# Patient Record
Sex: FEMALE | Race: WHITE | NOT HISPANIC OR LATINO | Employment: OTHER | URBAN - METROPOLITAN AREA
[De-identification: names, ages, dates, MRNs, and addresses within clinical notes are randomized per-mention and may not be internally consistent; named-entity substitution may affect disease eponyms.]

---

## 2022-06-03 ENCOUNTER — TELEPHONE (OUTPATIENT)
Dept: CARDIOLOGY | Facility: MEDICAL CENTER | Age: 82
End: 2022-06-03

## 2022-06-03 NOTE — TELEPHONE ENCOUNTER
Called patient to request records for NP appointment with LS. Called to verify previous cardiologists the patient has seen and to confirm if the patient has had any recent cardiac imaging, testing, or lab work done outside of [facility]. No answer, left voicemail to call back.

## 2022-06-06 NOTE — PROGRESS NOTES
Subjective:   Chief Complaint:   Chief Complaint   Patient presents with   • Atrial Fibrillation       Pham Stafford is a 81 y.o. female who is self-referred to establish with a local cardiologist for the management of atrial flutter, persistent atrial fibrillation, hyperlipidemia, IFG, chronic anticoagulation, tricuspid regurgitation, mild LV dysfunction.    I was able to review cardiology note from [facility] dated 3/11/2022, provider was Dr. Mine Burnham.  Patient had presented 2022 with new onset atrial flutter with 2:1 conduction, rate 138 bpm (I can see the report but not the actual ECG), underwent cardioversion 3/4/2022 for an ECG that was reported as atrial fibrillation.    TSH was normal.  High-sensitivity troponin was normal.  proBNP was up a bit  Remains on diltiazem and digoxin.  Mildly dilated left atrium and EF of 50% and 2022 at [facility], in [place].  Today, her ECG shows atrial fibrillation, rate 90.  She is noting fluttering in the chest, she suspected, suspects the CV lasted about 1 month.  Feels short of breath, some fatigue.    She drinks 1-2 alcoholic beverages daily, sometimes more if out gambling.  Drinks decaf coffee.    Remains on Eliquis for CHADS2 vascular score of 6, prior TIA.  Was walking, stopped at neighbors house, was confused, jumbled words.    Echo 2022 with moderate to severe TR, RVSP 41.    Has some mild chronic HFpEF, on furosemide, recalls starting this for LE edema.  Accidentally taking furosemide twice daily including before bedtime.  Has [facility] 2-3, stage B.  No significant orthopnea.    Has hypertension, controlled.    Has hyperlipidemia, on atorvastatin 40 mg.  LDL cholesterol 100 in 2021.    Has impaired fasting glucose but no diabetes.    Has chronic venous insufficiency with mild chronic LE edema, not getting worse.    Has CKD 3a.    Gets diarrhea at times.    Father  at 76 of COPD, ? myocardial infarction in 60s.   Mother   at 82 from a stroke.  Sisters with MS.  Daughter Elba has MS.    Not limited by chest pain, pressure or tightness with activity.   No significant lightheadedness, or presyncope/syncope.   No symptoms of leg claudication.   No stroke/TIA like symptoms.    She smoked 1 pack per day for 20 years quitting in .   No recreation substance use.    She has 3 adult children.    in 2016, leukemia, copd.   Living part-time in a [facility] community alone.  Then [place] in the winter, Nov to April in [place].    Here with her daughter Elba, she lives in [place].    DATA REVIEWED by me:  ECG (my personal interpretation) 2022  Atrial fibrillation, rate 90, nonspecific ST changes    24-hour Holter Btarget  *The predominant rhythm was with sustained variable atrial flutter.   *The Maximum Heart Rate recorded was 148 bpm, Day :30:04 am, the   Minimum Heart Rate recorded was   59 bpm, Day 2 / 10:26:14 am and the Average Heart Rate was 120 bpm.   *There were 265 PVCs with a burden of 0.15 %.   *The study included an Atrial Flutter [place] of 100 %. The longest episode   was 23h 56min, Day :09:24 pm   and the fastest episode was 148 bpm, Day :09:24 pm.      Echo Btarget 2022  Conclusions   Normal LV cavity size. Normal LV wall thickness. Borderline reduced LV systolic function. Estimated LVEF of 50%. Indeterminate LV diastolic function.   Normal right ventricular size and systolic function   Mild left atrial enlargement.   Normal right atrial size.   Interatrial septum appears intact. No interatrial shunt by bubble study.   Mild mitral annular calcification. Mildly thickened mitral valve leaflets with normal leaflet mobility. Mild mitral regurgitation.   Mild aortic valve thickening with normal leaflet mobility.   Normal appearing tricuspid valve. Moderate to severe tricuspid regurgitation. Estimated RVSP of 41 mm Hg suggests mild pulmonary hypertension.   Mild pulmonic insufficiency.    No pericardial effusion.   Inferior vena cava is normal in size with a normal collapsibility index, suggesting normal RA pressure.   Persistent atrial flutter.       Most recent labs:       No results found for: WBC, HEMOGLOBIN, HEMATOCRIT, MCV, INR, TSH   No results found for: SODIUM, POTASSIUM, CHLORIDE, CO2, GLUCOSE, BUN, CREATININE, GLOMRATE   No results found for: ASTSGOT, ALTSGPT, ALBUMIN   No results found for: CHOLSTRLTOT, LDL, HDL, TRIGLYCERIDE  No results for input(s): NTPROBNP, TROPONINT in the last 72 hours.      History reviewed. No pertinent past medical history.  History reviewed. No pertinent surgical history.  Family History   Problem Relation Age of Onset   • Stroke Mother          of CVA at 82   • Heart Disease Father         MI? 60s   • Lung Disease Father          of COPD at 76     Social History     Socioeconomic History   • Marital status:      Spouse name: Not on file   • Number of children: Not on file   • Years of education: Not on file   • Highest education level: Not on file   Occupational History   • Not on file   Tobacco Use   • Smoking status: Former Smoker   • Smokeless tobacco: Former User   • Tobacco comment: quit 1992   Substance and Sexual Activity   • Alcohol use: Yes     Alcohol/week: 1.2 - 1.8 oz     Types: 1 Glasses of wine, 1 - 2 Cans of beer per week     Comment: 5-7 times a week   • Drug use: Never   • Sexual activity: Not on file   Other Topics Concern   • Not on file   Social History Narrative   • Not on file     Social Determinants of Health     Financial Resource Strain: Not on file   Food Insecurity: Not on file   Transportation Needs: Not on file   Physical Activity: Not on file   Stress: Not on file   Social Connections: Not on file   Intimate Partner Violence: Not on file   Housing Stability: Not on file     Not on File    Current Outpatient Medications   Medication Sig Dispense Refill   • pantoprazole (PROTONIX) 40 MG Tablet Delayed Response TAKE 1  "TABLET BY MOUTH EVERY DAY 30 MINUTES BEFORE A MEAL     • ELIQUIS 5 MG Tab TAKE 1 TABLET BY MOUTH TWICE A DAY FOR BLOOD CLOT PREVENTION OR ATRIAL FIBRILLATION     • potassium chloride SA (KDUR) 20 MEQ Tab CR Take 1 Tablet by mouth 2 times a day with meals.     • digoxin (LANOXIN) 125 MCG Tab Take 125 mcg by mouth every day.     • DILTIAZem CD (CARDIZEM CD) 240 MG CAPSULE SR 24 HR Take 240 mg by mouth every day.     • CALCIUM PO Take  by mouth.     • glucosamine Sulfate 500 MG Cap Take 500 mg by mouth 3 times a day with meals.     • Multiple Vitamin (MULTI VITAMIN PO) Take  by mouth.     • Fexofenadine HCl (ALLEGRA PO) Take  by mouth.     • Multiple Vitamins-Minerals (LUTEIN-ZEAXANTHIN PO) Take  by mouth.     • furosemide (LASIX) 20 MG Tab Take 2 Tablets by mouth every day. 180 Tablet 7   • dronedarone (MULTAQ) 400 MG Tab Take 1 Tablet by mouth 2 times a day with meals. 60 Tablet 11   • atorvastatin (LIPITOR) 80 MG tablet Take 1 Tablet by mouth every evening. 90 Tablet 7     No current facility-administered medications for this visit.       ROS  All others systems reviewed and negative.     Objective:     /66 (BP Location: Left arm, Patient Position: Sitting, BP Cuff Size: Adult)   Pulse 73   Resp 16   Ht 1.6 m (5' 3\")   Wt 77.1 kg (170 lb)   SpO2 95%  Body mass index is 30.11 kg/m².    General: No acute distress. Well nourished.  HEENT: EOM grossly intact, no scleral icterus, no pharyngeal erythema.   Neck:  No JVD, no bruits, trachea midline  CVS: Irreg irreg. Normal S1, S2. 1/6 sys murmur LLSB. 1+ pitting LE edema.  2+ radial pulses, 2+ PT pulses  Resp: CTAB. No wheezing or crackles/rhonchi. Normal respiratory effort.  Abdomen: Soft, NT, no lorene hepatomegaly.  MSK/Ext: No clubbing or cyanosis.  Skin: Warm and dry, no rashes.  Neurological: CN III-XII grossly intact. No focal deficits.   Psych: A&O x 3, appropriate affect, good judgement      Assessment:     1. Persistent atrial fibrillation (HCC)  EKG "    EC-AURA W/O CONT    CL-CARDIOVERSION   2. Typical atrial flutter (HCC)  EKG   3. Chronic anticoagulation  EKG   4. Impaired fasting glucose  EKG   5. Mixed hyperlipidemia  EKG   6. Chronic heart failure with preserved ejection fraction (HFpEF) (HCC)  EKG   7. LV dysfunction  EKG    EF 50% in the setting of atrial flutter January 2022   8. Tricuspid valve regurgitation, nonrheumatic  EKG   9. Chronic venous insufficiency     10. Gastroesophageal reflux disease without esophagitis     11. Former smoker     12. TIA (transient ischemic attack)     13. Stage 3a chronic kidney disease (HCC)         Medical Decision Making:  Today's Assessment / Status / Plan:     -Back in atrial fibrillation today, appears symptomatic, will try Multaq if this is affordable.  Otherwise I would recommend a stress test followed by flecainide and beta-blocker.  If she can afford the Multaq then she will stop the Cardizem and digoxin, see below-  -Getting her off of Cardizem can also help with venous insufficiency   -AURA guided cardioversion in [place]  -Continue anticoagulation for CHADS2 vascular score of 7 including prior TIA, she will try not to skip doses  -Should not be taking aspirin with her Eliquis, she will stop her aspirin  -Blood pressure looks good  -Considering prior TIA, LDL cholesterol not to goal at 70, changed to atorvastatin 80 mg, will get follow-up down the road  -Prior echocardiogram with moderate to severe TR, I do not hear significant TR murmur so this may have already gotten better with furosemide but she will need a follow-up echocardiogram in a few months  -Change her furosemide to 40 mg all at once morning or afternoon but not before bedtime  -Blood pressure looks okay  -Close follow-up AURA guided cardioversion in [place]      Written instructions given today:      -Furosemide is also called Lasix.  This is a pill that gets rid of excess water in your body.  It is potent for 6 hours that is why we like to call Lasix.   You should take both your pills at the same time once a day.  You can plan your day around when you take the medication.  You can take it in the morning or the afternoon but do not take it after 5 PM to make sure it does not interfere with sleep.  If you take it by 5 PM I will be out of your system by 11 PM    -Atorvastatin is a powerful vascular medication.  It is also cholesterol medication.  It slows down the growth of blockages in the blood vessels around the body including the heart and brain and minimizes the risk of a sudden heart attack or stroke.    -Your LDL cholesterol, L stands for lousy, is supposed to be 70 since you have had a prior TIA.  Your most recent LDL was around 100.  I am changing your atorvastatin 40 mg to atorvastatin 80 mg.  You can use up to of your atorvastatin 40 mg at the same time until they are gone.    -People can get achy muscles on the statins at any time.  If you are noticing that the muscles or joints or aching like you have the flu, let us know.    -Cardizem and digoxin were medications intended to keep you out of atrial fibrillation and atrial flutter but they did not work.    -I am going to give you a medication called Multaq/Dronedarone 600 mg to be taken twice daily.  This is a powerful antiarrhythmic heart medication.  If it is affordable, it is the easiest 1 to take.  If this medication is over $300, do not fill it, it is not worth it.    -When you  the Multaq, simply stop the Cardizem and digoxin and start the Multaq.    -We will need to get you back into a normal rhythm which has to be done up in [place].    -We will follow-up on an echocardiogram down the road to follow the moderate to severely leaking tricuspid valve.  I suspect it is no longer moderate to severely leaking.    -Your heart's ability to pump was mildly reduced at 50%.  Normal EF/ejection fraction is 60 to 65%.  I would expect this to return to normal when you are back in a normal rhythm.    -Cut  back on alcohol as it can poke the barrier on atrial fibrillation.  Try to stick with no more than 1 alcohol unit a day.  That means 1 ounce of hard liquor, a very small glass of wine or 1 can of beer.    -Cardizem is a good medication but it can contribute to venous insufficiency so moving forward we will try to avoid this.    -The main [facility] cardiology offices in [place].  When he have questions or concerns, you always call the mother ship at [phone].    -Call the [employer] line to get set up, either on your computer or by downloading an blaine on your smart phone.  Keep them on the phone with you until you are sure you can access the blaine or website.  If you use your phone, you can set up a finger print to use if you tend to forget passwords.  Call [phone] or [phone].    -With atrial fibrillation there is always a small chance of stroke but the chances so small that we do not worry about it.  Just like driving her car.  However, will be shocky back into normal rhythm, the risk of stroke does go up so please stay on your blood thinner for 30 days after.    -My office will call you to arrange something called a AURA guided cardioversion.  The for step of the procedure is a transesophageal echocardiogram where we passed a probe down your esophagus to make sure there is no blood clot and then we shock your heart.  We do this while you are sedated.  You will need a ride to [place] and a ride home.    -Your heart medications as follows:  -Furosemide is solely a water pill to get rid of fluid.  -Potassium is taken with furosemide because furosemide causes you to lose potassium.  Potassium supplement is good for your electrical system of your heart.  -Cardizem and digoxin are medications intended to keep you out of atrial fibrillation but they are not working.  We are swapping these medications for Multaq/dronedarone.  -Atorvastatin is your cholesterol medicine and a powerful vascular anti-inflammatory medicine  reducing the risk of heart attack and stroke  -Pantoprazole is a stomach medicine  -Eliquis/Apixaban is your blood thinner to protect you from stroke.  Do not take aspirin with this medication.    *If you cannot afford the Multaq/dronedarone, call my office for a substitute.  Use [employer] or call [phone].    Return in about 4 weeks (around 7/6/2022).    It is my pleasure to participate in the care of Ms. Stafford.  Please do not hesitate to contact me with questions or concerns.    Krystle Maher MD, [facility]  Cardiologist [facility]    Please note that this dictation was created using voice recognition software. I have made every reasonable attempt to correct obvious errors, but it is possible there are errors of grammar and possibly content that I did not discover before finalizing the note.

## 2022-06-08 ENCOUNTER — OFFICE VISIT (OUTPATIENT)
Dept: CARDIOLOGY | Facility: PHYSICIAN GROUP | Age: 82
End: 2022-06-08
Payer: MEDICARE

## 2022-06-08 VITALS
DIASTOLIC BLOOD PRESSURE: 66 MMHG | WEIGHT: 170 LBS | RESPIRATION RATE: 16 BRPM | SYSTOLIC BLOOD PRESSURE: 104 MMHG | OXYGEN SATURATION: 95 % | BODY MASS INDEX: 30.12 KG/M2 | HEART RATE: 73 BPM | HEIGHT: 63 IN

## 2022-06-08 DIAGNOSIS — Z79.01 CHRONIC ANTICOAGULATION: ICD-10-CM

## 2022-06-08 DIAGNOSIS — R73.01 IMPAIRED FASTING GLUCOSE: ICD-10-CM

## 2022-06-08 DIAGNOSIS — E78.2 MIXED HYPERLIPIDEMIA: ICD-10-CM

## 2022-06-08 DIAGNOSIS — I50.32 CHRONIC HEART FAILURE WITH PRESERVED EJECTION FRACTION (HFPEF) (HCC): ICD-10-CM

## 2022-06-08 DIAGNOSIS — I51.9 LV DYSFUNCTION: ICD-10-CM

## 2022-06-08 DIAGNOSIS — K21.9 GASTROESOPHAGEAL REFLUX DISEASE WITHOUT ESOPHAGITIS: ICD-10-CM

## 2022-06-08 DIAGNOSIS — I87.2 CHRONIC VENOUS INSUFFICIENCY: ICD-10-CM

## 2022-06-08 DIAGNOSIS — G45.9 TIA (TRANSIENT ISCHEMIC ATTACK): ICD-10-CM

## 2022-06-08 DIAGNOSIS — Z87.891 FORMER SMOKER: ICD-10-CM

## 2022-06-08 DIAGNOSIS — I48.3 TYPICAL ATRIAL FLUTTER (HCC): ICD-10-CM

## 2022-06-08 DIAGNOSIS — I36.1 TRICUSPID VALVE REGURGITATION, NONRHEUMATIC: ICD-10-CM

## 2022-06-08 DIAGNOSIS — I48.19 PERSISTENT ATRIAL FIBRILLATION (HCC): ICD-10-CM

## 2022-06-08 DIAGNOSIS — N18.31 STAGE 3A CHRONIC KIDNEY DISEASE: ICD-10-CM

## 2022-06-08 LAB — EKG IMPRESSION: NORMAL

## 2022-06-08 PROCEDURE — 99204 OFFICE O/P NEW MOD 45 MIN: CPT | Mod: 25 | Performed by: INTERNAL MEDICINE

## 2022-06-08 PROCEDURE — 93000 ELECTROCARDIOGRAM COMPLETE: CPT | Performed by: INTERNAL MEDICINE

## 2022-06-08 RX ORDER — APIXABAN 5 MG/1
5 TABLET, FILM COATED ORAL 2 TIMES DAILY
COMMUNITY
Start: 2022-06-01 | End: 2023-05-03 | Stop reason: SDUPTHER

## 2022-06-08 RX ORDER — FUROSEMIDE 20 MG/1
20 TABLET ORAL 2 TIMES DAILY
COMMUNITY
Start: 2022-05-12 | End: 2022-06-08 | Stop reason: SDUPTHER

## 2022-06-08 RX ORDER — ATORVASTATIN CALCIUM 40 MG/1
40 TABLET, FILM COATED ORAL
COMMUNITY
Start: 2022-04-29 | End: 2022-06-08

## 2022-06-08 RX ORDER — SODIUM PHOSPHATE,MONO-DIBASIC 19G-7G/118
500 ENEMA (ML) RECTAL
COMMUNITY
End: 2022-06-13

## 2022-06-08 RX ORDER — ATORVASTATIN CALCIUM 80 MG/1
80 TABLET, FILM COATED ORAL NIGHTLY
Qty: 90 TABLET | Refills: 7 | Status: SHIPPED | OUTPATIENT
Start: 2022-06-08 | End: 2023-05-03 | Stop reason: SDUPTHER

## 2022-06-08 RX ORDER — DIGOXIN 125 MCG
125 TABLET ORAL EVERY MORNING
COMMUNITY
Start: 2022-04-29 | End: 2022-06-21

## 2022-06-08 RX ORDER — FUROSEMIDE 20 MG/1
40 TABLET ORAL EVERY MORNING
Qty: 180 TABLET | Refills: 7 | COMMUNITY
Start: 2022-06-08 | End: 2023-05-03

## 2022-06-08 RX ORDER — POTASSIUM CHLORIDE 20 MEQ/1
1 TABLET, EXTENDED RELEASE ORAL 2 TIMES DAILY WITH MEALS
COMMUNITY
Start: 2022-03-07 | End: 2022-09-28 | Stop reason: SDUPTHER

## 2022-06-08 RX ORDER — DRONEDARONE 400 MG/1
400 TABLET, FILM COATED ORAL 2 TIMES DAILY WITH MEALS
Qty: 60 TABLET | Refills: 11 | Status: SHIPPED | OUTPATIENT
Start: 2022-06-08 | End: 2022-06-09

## 2022-06-08 RX ORDER — DILTIAZEM HYDROCHLORIDE 240 MG/1
240 CAPSULE, COATED, EXTENDED RELEASE ORAL EVERY MORNING
COMMUNITY
Start: 2022-04-29 | End: 2022-06-21

## 2022-06-08 RX ORDER — PANTOPRAZOLE SODIUM 40 MG/1
40 TABLET, DELAYED RELEASE ORAL EVERY MORNING
COMMUNITY
Start: 2022-05-03

## 2022-06-08 NOTE — PATIENT INSTRUCTIONS
-Furosemide is also called Lasix.  This is a pill that gets rid of excess water in your body.  It is potent for 6 hours that is why we like to call Lasix.  You should take both your pills at the same time once a day.  You can plan your day around when you take the medication.  You can take it in the morning or the afternoon but do not take it after 5 PM to make sure it does not interfere with sleep.  If you take it by 5 PM I will be out of your system by 11 PM    -Atorvastatin is a powerful vascular medication.  It is also cholesterol medication.  It slows down the growth of blockages in the blood vessels around the body including the heart and brain and minimizes the risk of a sudden heart attack or stroke.    -Your LDL cholesterol, L stands for lousy, is supposed to be 70 since you have had a prior TIA.  Your most recent LDL was around 100.  I am changing your atorvastatin 40 mg to atorvastatin 80 mg.  You can use up to of your atorvastatin 40 mg at the same time until they are gone.    -People can get achy muscles on the statins at any time.  If you are noticing that the muscles or joints or aching like you have the flu, let us know.    -Cardizem and digoxin were medications intended to keep you out of atrial fibrillation and atrial flutter but they did not work.    -I am going to give you a medication called Multaq/Dronedarone 600 mg to be taken twice daily.  This is a powerful antiarrhythmic heart medication.  If it is affordable, it is the easiest 1 to take.  If this medication is over $300, do not fill it, it is not worth it.    -When you  the Multaq, simply stop the Cardizem and digoxin and start the Multaq.    -We will need to get you back into a normal rhythm which has to be done up in [place].    -We will follow-up on an echocardiogram down the road to follow the moderate to severely leaking tricuspid valve.  I suspect it is no longer moderate to severely leaking.    -Your heart's ability to pump was  mildly reduced at 50%.  Normal EF/ejection fraction is 60 to 65%.  I would expect this to return to normal when you are back in a normal rhythm.    -Cut back on alcohol as it can poke the barrier on atrial fibrillation.  Try to stick with no more than 1 alcohol unit a day.  That means 1 ounce of hard liquor, a very small glass of wine or 1 can of beer.    -Cardizem is a good medication but it can contribute to venous insufficiency so moving forward we will try to avoid this.    -The main [facility] cardiology offices in [place].  When he have questions or concerns, you always call the mother ship at [phone].    -Call the [employer] line to get set up, either on your computer or by downloading an blaine on your smart phone.  Keep them on the phone with you until you are sure you can access the blaine or website.  If you use your phone, you can set up a finger print to use if you tend to forget passwords.  Call [phone] or [phone].    -With atrial fibrillation there is always a small chance of stroke but the chances so small that we do not worry about it.  Just like driving her car.  However, will be shocky back into normal rhythm, the risk of stroke does go up so please stay on your blood thinner for 30 days after.    -My office will call you to arrange something called a AURA guided cardioversion.  The for step of the procedure is a transesophageal echocardiogram where we passed a probe down your esophagus to make sure there is no blood clot and then we shock your heart.  We do this while you are sedated.  You will need a ride to [place] and a ride home.    -Your heart medications as follows:  -Furosemide is solely a water pill to get rid of fluid.  -Potassium is taken with furosemide because furosemide causes you to lose potassium.  Potassium supplement is good for your electrical system of your heart.  -Cardizem and digoxin are medications intended to keep you out of atrial fibrillation but they are not working.  We are  swapping these medications for Multaq/dronedarone.  -Atorvastatin is your cholesterol medicine and a powerful vascular anti-inflammatory medicine reducing the risk of heart attack and stroke  -Pantoprazole is a stomach medicine  -Eliquis/Apixaban is your blood thinner to protect you from stroke.  Do not take aspirin with this medication.    *If you cannot afford the Multaq/dronedarone, call my office for a substitute.  Use [employer] or call [phone].

## 2022-06-08 NOTE — LETTER
[facility]   [address]  [place] [address]  Phone: [phone]  Fax: [phone]              Pham Stafford  1940    Encounter Date: 6/8/2022    Krystle Maher M.D.          PROGRESS NOTE:  Subjective:   Chief Complaint:   Chief Complaint   Patient presents with   • Atrial Fibrillation       Pham Stafford is a 81 y.o. female who is self-referred to establish with a local cardiologist for the management of atrial flutter, persistent atrial fibrillation, hyperlipidemia, IFG, chronic anticoagulation, tricuspid regurgitation, mild LV dysfunction.    I was able to [facility], provider was Dr. Mine Burnham.  Patient had presented January 16, 2022 with new onset atrial flutter with 2:1 conduction, rate 138 bpm (I can see the report but not the actual ECG), underwent cardioversion 3/4/2022 for an ECG that was reported as atrial fibrillation.    TSH was normal.  High-sensitivity troponin was normal.  proBNP was up a bit  Remains on diltiazem and digoxin.  Mildly dilated left atrium and EF of 50% and [facility] [place]  Today, her ECG shows atrial fibrillation, rate 90.  She is noting fluttering in the chest, she suspected, suspects the CV lasted about 1 month.  Feels short of breath, some fatigue.    She drinks 1-2 alcoholic beverages daily, sometimes more if out gambling.  Drinks decaf coffee.    Remains on Eliquis for CHADS2 vascular score of 6, prior TIA.  Was walking, stopped at neighbors house, was confused, jumbled words.    Echo January 2022 with moderate to severe TR, RVSP 41.    Has some mild chronic HFpEF, on furosemide, recalls starting this for LE edema.  Accidentally taking furosemide twice daily including before bedtime.  Has [facility] 2-3, stage B.  No significant orthopnea.    Has hypertension, controlled.    Has hyperlipidemia, on atorvastatin 40  mg.  LDL cholesterol 100 in 2021.    Has impaired fasting glucose but no diabetes.    Has chronic venous insufficiency with mild chronic LE edema, not getting worse.    Has CKD 3a.    Gets diarrhea at times.    Father  at 76 of COPD, ? myocardial infarction in 60s.   Mother  at 82 from a stroke.  Sisters with MS.  Daughter Elba has MS.    Not limited by chest pain, pressure or tightness with activity.   No significant lightheadedness, or presyncope/syncope.   No symptoms of leg claudication.   No stroke/TIA like symptoms.    She smoked 1 pack per day for 20 years quitting in .   No recreation substance use.    She has 3 adult children.    in 2016, leukemia, copd.   Living part-time in a residential community alone.  Then [place] in the winter, Nov to April in [place].    Here with her daughter Elba, she lives in [place].    DATA REVIEWED by me:  ECG (my personal interpretation) 2022  Atrial fibrillation, rate 90, nonspecific ST changes    24-hour Holter [place]  *The predominant rhythm was with sustained variable atrial flutter.   *The Maximum Heart Rate recorded was 148 bpm, Day :30:04 am, the   Minimum Heart Rate recorded was   59 bpm, Day 2 / 10:26:14 am and the Average Heart Rate was 120 bpm.   *There were 265 PVCs with a burden of 0.15 %.   *The study included an Atrial Flutter [place] of 100 %. The longest episode   was 23h 56min, Day :09:24 pm   and the fastest episode was 148 bpm, Day :09:24 pm.      E[facility]  Conclusions   Normal LV cavity size. Normal LV wall thickness. Borderline reduced LV systolic function. Estimated LVEF of 50%. Indeterminate LV diastolic function.   Normal right ventricular size and systolic function   Mild left atrial enlargement.   Normal right atrial size.   Interatrial septum appears intact. No interatrial shunt by bubble study.   Mild mitral annular calcification. Mildly thickened mitral valve leaflets with  normal leaflet mobility. Mild mitral regurgitation.   Mild aortic valve thickening with normal leaflet mobility.   Normal appearing tricuspid valve. Moderate to severe tricuspid regurgitation. Estimated RVSP of 41 mm Hg suggests mild pulmonary hypertension.   Mild pulmonic insufficiency.   No pericardial effusion.   Inferior vena cava is normal in size with a normal collapsibility index, suggesting normal RA pressure.   Persistent atrial flutter.       Most recent labs:       No results found for: WBC, HEMOGLOBIN, HEMATOCRIT, MCV, INR, TSH   No results found for: SODIUM, POTASSIUM, CHLORIDE, CO2, GLUCOSE, BUN, CREATININE, GLOMRATE   No results found for: ASTSGOT, ALTSGPT, ALBUMIN   No results found for: CHOLSTRLTOT, LDL, HDL, TRIGLYCERIDE  No results for input(s): NTPROBNP, TROPONINT in the last 72 hours.      History reviewed. No pertinent past medical history.  History reviewed. No pertinent surgical history.  Family History   Problem Relation Age of Onset   • Stroke Mother          of CVA at 82   • Heart Disease Father         MI? 60s   • Lung Disease Father          of COPD at 76     Social History     Socioeconomic History   • Marital status:      Spouse name: Not on file   • Number of children: Not on file   • Years of education: Not on file   • Highest education level: Not on file   Occupational History   • Not on file   Tobacco Use   • Smoking status: Former Smoker   • Smokeless tobacco: Former User   • Tobacco comment: quit    Substance and Sexual Activity   • Alcohol use: Yes     Alcohol/week: 1.2 - 1.8 oz     Types: 1 Glasses of wine, 1 - 2 Cans of beer per week     Comment: 5-7 times a week   • Drug use: Never   • Sexual activity: Not on file   Other Topics Concern   • Not on file   Social History Narrative   • Not on file     Social Determinants of Health     Financial Resource Strain: Not on file   Food Insecurity: Not on file   Transportation Needs: Not on file   Physical Activity:  "Not on file   Stress: Not on file   Social Connections: Not on file   Intimate Partner Violence: Not on file   Housing Stability: Not on file     Not on File    Current Outpatient Medications   Medication Sig Dispense Refill   • pantoprazole (PROTONIX) 40 MG Tablet Delayed Response TAKE 1 TABLET BY MOUTH EVERY DAY 30 MINUTES BEFORE A MEAL     • ELIQUIS 5 MG Tab TAKE 1 TABLET BY MOUTH TWICE A DAY FOR BLOOD CLOT PREVENTION OR ATRIAL FIBRILLATION     • potassium chloride SA (KDUR) 20 MEQ Tab CR Take 1 Tablet by mouth 2 times a day with meals.     • digoxin (LANOXIN) 125 MCG Tab Take 125 mcg by mouth every day.     • DILTIAZem CD (CARDIZEM CD) 240 MG CAPSULE SR 24 HR Take 240 mg by mouth every day.     • CALCIUM PO Take  by mouth.     • glucosamine Sulfate 500 MG Cap Take 500 mg by mouth 3 times a day with meals.     • Multiple Vitamin (MULTI VITAMIN PO) Take  by mouth.     • Fexofenadine HCl (ALLEGRA PO) Take  by mouth.     • Multiple Vitamins-Minerals (LUTEIN-ZEAXANTHIN PO) Take  by mouth.     • furosemide (LASIX) 20 MG Tab Take 2 Tablets by mouth every day. 180 Tablet 7   • dronedarone (MULTAQ) 400 MG Tab Take 1 Tablet by mouth 2 times a day with meals. 60 Tablet 11   • atorvastatin (LIPITOR) 80 MG tablet Take 1 Tablet by mouth every evening. 90 Tablet 7     No current facility-administered medications for this visit.       ROS  All others systems reviewed and negative.     Objective:     /66 (BP Location: Left arm, Patient Position: Sitting, BP Cuff Size: Adult)   Pulse 73   Resp 16   Ht 1.6 m (5' 3\")   Wt 77.1 kg (170 lb)   SpO2 95%  Body mass index is 30.11 kg/m².    General: No acute distress. Well nourished.  HEENT: EOM grossly intact, no scleral icterus, no pharyngeal erythema.   Neck:  No JVD, no bruits, trachea midline  CVS: Irreg irreg. Normal S1, S2. 1/6 sys murmur LLSB. 1+ pitting LE edema.  2+ radial pulses, 2+ PT pulses  Resp: CTAB. No wheezing or crackles/rhonchi. Normal respiratory " effort.  Abdomen: Soft, NT, no lorene hepatomegaly.  MSK/Ext: No clubbing or cyanosis.  Skin: Warm and dry, no rashes.  Neurological: CN III-XII grossly intact. No focal deficits.   Psych: A&O x 3, appropriate affect, good judgement      Assessment:     1. Persistent atrial fibrillation (HCC)  EKG    EC-AURA W/O CONT    CL-CARDIOVERSION   2. Typical atrial flutter (HCC)  EKG   3. Chronic anticoagulation  EKG   4. Impaired fasting glucose  EKG   5. Mixed hyperlipidemia  EKG   6. Chronic heart failure with preserved ejection fraction (HFpEF) (HCC)  EKG   7. LV dysfunction  EKG    EF 50% in the setting of atrial flutter January 2022   8. Tricuspid valve regurgitation, nonrheumatic  EKG   9. Chronic venous insufficiency     10. Gastroesophageal reflux disease without esophagitis     11. Former smoker     12. TIA (transient ischemic attack)     13. Stage 3a chronic kidney disease (HCC)         Medical Decision Making:  Today's Assessment / Status / Plan:     -Back in atrial fibrillation today, appears symptomatic, will try Multaq if this is affordable.  Otherwise I would recommend a stress test followed by flecainide and beta-blocker.  If she can afford the Multaq then she will stop the Cardizem and digoxin, see below-  -Getting her off of Cardizem can also help with venous insufficiency   -AURA guided cardioversion in [place]  -Continue anticoagulation for CHADS2 vascular score of 7 including prior TIA, she will try not to skip doses  -Should not be taking aspirin with her Eliquis, she will stop her aspirin  -Blood pressure looks good  -Considering prior TIA, LDL cholesterol not to goal at 70, changed to atorvastatin 80 mg, will get follow-up down the road  -Prior echocardiogram with moderate to severe TR, I do not hear significant TR murmur so this may have already gotten better with furosemide but she will need a follow-up echocardiogram in a few months  -Change her furosemide to 40 mg all at once morning or afternoon but  not before bedtime  -Blood pressure looks okay  -Close follow-up AURA guided cardioversion in [place]      Written instructions given today:      -Furosemide is also called Lasix.  This is a pill that gets rid of excess water in your body.  It is potent for 6 hours that is why we like to call Lasix.  You should take both your pills at the same time once a day.  You can plan your day around when you take the medication.  You can take it in the morning or the afternoon but do not take it after 5 PM to make sure it does not interfere with sleep.  If you take it by 5 PM I will be out of your system by 11 PM    -Atorvastatin is a powerful vascular medication.  It is also cholesterol medication.  It slows down the growth of blockages in the blood vessels around the body including the heart and brain and minimizes the risk of a sudden heart attack or stroke.    -Your LDL cholesterol, L stands for lousy, is supposed to be 70 since you have had a prior TIA.  Your most recent LDL was around 100.  I am changing your atorvastatin 40 mg to atorvastatin 80 mg.  You can use up to of your atorvastatin 40 mg at the same time until they are gone.    -People can get achy muscles on the statins at any time.  If you are noticing that the muscles or joints or aching like you have the flu, let us know.    -Cardizem and digoxin were medications intended to keep you out of atrial fibrillation and atrial flutter but they did not work.    -I am going to give you a medication called Multaq/Dronedarone 600 mg to be taken twice daily.  This is a powerful antiarrhythmic heart medication.  If it is affordable, it is the easiest 1 to take.  If this medication is over $300, do not fill it, it is not worth it.    -When you  the Multaq, simply stop the Cardizem and digoxin and start the Multaq.    -We will need to get you back into a normal rhythm which has to be done up in [place].    -We will follow-up on an echocardiogram down the road to follow  the moderate to severely leaking tricuspid valve.  I suspect it is no longer moderate to severely leaking.    -Your heart's ability to pump was mildly reduced at 50%.  Normal EF/ejection fraction is 60 to 65%.  I would expect this to return to normal when you are back in a normal rhythm.    -Cut back on alcohol as it can poke the barrier on atrial fibrillation.  Try to stick with no more than 1 alcohol unit a day.  That means 1 ounce of hard liquor, a very small glass of wine or 1 can of beer.    -Cardizem is a good medication but it can contribute to venous insufficiency so moving forward we will try to avoid this.    -The main [facility] cardiology offices in [place].  When he have questions or concerns, you always call the mother ship at [phone].    -Call the [email or web address] line to get set up, either on your computer or by downloading an blaine on your smart phone.  Keep them on the phone with you until you are sure you can access the blaine or website.  If you use your phone, you can set up a finger print to use if you tend to forget passwords.  Call [phone] or [phone].    -With atrial fibrillation there is always a small chance of stroke but the chances so small that we do not worry about it.  Just like driving her car.  However, will be shocky back into normal rhythm, the risk of stroke does go up so please stay on your blood thinner for 30 days after.    -My office will call you to arrange something called a AURA guided cardioversion.  The for step of the procedure is a transesophageal echocardiogram where we passed a probe down your esophagus to make sure there is no blood clot and then we shock your heart.  We do this while you are sedated.  You will need a ride to [place] and a ride home.    -Your heart medications as follows:  -Furosemide is solely a water pill to get rid of fluid.  -Potassium is taken with furosemide because furosemide causes you to lose potassium.  Potassium supplement is good for your  electrical system of your heart.  -Cardizem and digoxin are medications intended to keep you out of atrial fibrillation but they are not working.  We are swapping these medications for Multaq/dronedarone.  -Atorvastatin is your cholesterol medicine and a powerful vascular anti-inflammatory medicine reducing the risk of heart attack and stroke  -Pantoprazole is a stomach medicine  -Eliquis/Apixaban is your blood thinner to protect you from stroke.  Do not take aspirin with this medication.    *If you cannot afford the Multaq/dronedarone, call my office for a substitute.  Use [employer] or call [phone].    Return in about 4 weeks (around 7/6/2022).    It is my pleasure to participate in the care of Ms. Stafford.  Please do not hesitate to contact me with questions or concerns.    Krystle Maher MD, [facility]  Cardiologist [facility]    Please note that this dictation was created using voice recognition software. I have made every reasonable attempt to correct obvious errors, but it is possible there are errors of grammar and possibly content that I did not discover before finalizing the note.          No Recipients

## 2022-06-09 ENCOUNTER — TELEPHONE (OUTPATIENT)
Dept: CARDIOLOGY | Facility: MEDICAL CENTER | Age: 82
End: 2022-06-09
Payer: MEDICARE

## 2022-06-09 RX ORDER — AMIODARONE HYDROCHLORIDE 200 MG/1
200 TABLET ORAL SEE ADMIN INSTRUCTIONS
Qty: 200 TABLET | Refills: 3 | Status: SHIPPED | OUTPATIENT
Start: 2022-06-09 | End: 2023-05-03 | Stop reason: SDUPTHER

## 2022-06-09 NOTE — TELEPHONE ENCOUNTER
----- Message from Krystle Maher M.D. sent at 6/8/2022  4:15 PM PDT -----  Regarding: AURA CV with anesthesia if possible  Monday June 20th with me if possible, otherwise at her convenience.  Tx

## 2022-06-09 NOTE — TELEPHONE ENCOUNTER
Patient is scheduled on 6-20-22 for a AURA/Cv w/cons sed with . Patient was told to hold lasix AM day of procedure and to check in at 8:00 for a 10:00 procedure. H&P was done on 6-8-22 by Dr. Maher. Pre admit to call and do a telephone pre admit due to patient living out of area.

## 2022-06-09 NOTE — TELEPHONE ENCOUNTER
LUZ      Caller: Pham Stafford    Topic/issue: Pham called in and stated that LS told her to call in to her if the medication dronedarone (MULTAQ) 400 MG Tab is over $300 and it is $346 per month.    Callback Number: [phone]      Thank you,  Danica DOMÍNGUEZ

## 2022-06-10 NOTE — TELEPHONE ENCOUNTER
Called pt and discussed, she wrote down instructions. She will also try to access her MyChart as I informed her that I will send a message with all of the instructions for reference. Informed her that Amio rx has been sent the pharmacy and to call us with any questions.

## 2022-06-10 NOTE — PROGRESS NOTES
Amiodarone prescription to be started on 6/19/2022.  Stop digoxin, last dose 6/18/2022.  Remain on diltiazem.

## 2022-06-10 NOTE — TELEPHONE ENCOUNTER
M,  Tell the patient not to fill the Multaq.    Let pt know we will plan to start her on amiodarone right before the procedure.We will use amiodarone temporarily until we can get her in with an EP provider.      Last dose of digoxin will be on 6/18/2022.    Initiate amiodarone 400 mg on the evening of 6/19/2022 and also take on the morning 6/20/2022.    Remain on the Cardizem until instructed otherwise.

## 2022-06-13 ENCOUNTER — PRE-ADMISSION TESTING (OUTPATIENT)
Dept: ADMISSIONS | Facility: MEDICAL CENTER | Age: 82
End: 2022-06-13
Attending: INTERNAL MEDICINE
Payer: MEDICARE

## 2022-06-20 ENCOUNTER — HOSPITAL ENCOUNTER (OUTPATIENT)
Facility: MEDICAL CENTER | Age: 82
End: 2022-06-20
Attending: INTERNAL MEDICINE | Admitting: INTERNAL MEDICINE
Payer: MEDICARE

## 2022-06-20 ENCOUNTER — APPOINTMENT (OUTPATIENT)
Dept: CARDIOLOGY | Facility: MEDICAL CENTER | Age: 82
End: 2022-06-20
Attending: INTERNAL MEDICINE
Payer: MEDICARE

## 2022-06-20 VITALS
WEIGHT: 174.16 LBS | BODY MASS INDEX: 30.86 KG/M2 | HEIGHT: 63 IN | TEMPERATURE: 96.8 F | OXYGEN SATURATION: 97 % | DIASTOLIC BLOOD PRESSURE: 63 MMHG | SYSTOLIC BLOOD PRESSURE: 123 MMHG | RESPIRATION RATE: 20 BRPM | HEART RATE: 53 BPM

## 2022-06-20 DIAGNOSIS — I48.19 PERSISTENT ATRIAL FIBRILLATION (HCC): ICD-10-CM

## 2022-06-20 LAB
ANION GAP SERPL CALC-SCNC: 9 MMOL/L (ref 7–16)
BUN SERPL-MCNC: 19 MG/DL (ref 8–22)
CALCIUM SERPL-MCNC: 9.5 MG/DL (ref 8.5–10.5)
CHLORIDE SERPL-SCNC: 108 MMOL/L (ref 96–112)
CO2 SERPL-SCNC: 25 MMOL/L (ref 20–33)
CREAT SERPL-MCNC: 1.24 MG/DL (ref 0.5–1.4)
EKG IMPRESSION: NORMAL
EKG IMPRESSION: NORMAL
ERYTHROCYTE [DISTWIDTH] IN BLOOD BY AUTOMATED COUNT: 47.9 FL (ref 35.9–50)
GFR SERPLBLD CREATININE-BSD FMLA CKD-EPI: 43 ML/MIN/1.73 M 2
GLUCOSE SERPL-MCNC: 145 MG/DL (ref 65–99)
HCT VFR BLD AUTO: 44.2 % (ref 37–47)
HGB BLD-MCNC: 14.3 G/DL (ref 12–16)
INR PPP: 1.75 (ref 0.87–1.13)
LV EJECT FRACT  99904: 60
MCH RBC QN AUTO: 33 PG (ref 27–33)
MCHC RBC AUTO-ENTMCNC: 32.4 G/DL (ref 33.6–35)
MCV RBC AUTO: 102.1 FL (ref 81.4–97.8)
PLATELET # BLD AUTO: 187 K/UL (ref 164–446)
PMV BLD AUTO: 10.9 FL (ref 9–12.9)
POTASSIUM SERPL-SCNC: 4.4 MMOL/L (ref 3.6–5.5)
PROTHROMBIN TIME: 19.9 SEC (ref 12–14.6)
RBC # BLD AUTO: 4.33 M/UL (ref 4.2–5.4)
SODIUM SERPL-SCNC: 142 MMOL/L (ref 135–145)
WBC # BLD AUTO: 6.1 K/UL (ref 4.8–10.8)

## 2022-06-20 PROCEDURE — 700111 HCHG RX REV CODE 636 W/ 250 OVERRIDE (IP): Performed by: INTERNAL MEDICINE

## 2022-06-20 PROCEDURE — 85610 PROTHROMBIN TIME: CPT

## 2022-06-20 PROCEDURE — 93312 ECHO TRANSESOPHAGEAL: CPT

## 2022-06-20 PROCEDURE — 36415 COLL VENOUS BLD VENIPUNCTURE: CPT

## 2022-06-20 PROCEDURE — 92960 CARDIOVERSION ELECTRIC EXT: CPT | Performed by: INTERNAL MEDICINE

## 2022-06-20 PROCEDURE — 160002 HCHG RECOVERY MINUTES (STAT)

## 2022-06-20 PROCEDURE — 93005 ELECTROCARDIOGRAM TRACING: CPT | Mod: XU | Performed by: INTERNAL MEDICINE

## 2022-06-20 PROCEDURE — 93312 ECHO TRANSESOPHAGEAL: CPT | Mod: 26 | Performed by: INTERNAL MEDICINE

## 2022-06-20 PROCEDURE — 160036 HCHG PACU - EA ADDL 30 MINS PHASE I

## 2022-06-20 PROCEDURE — 93325 DOPPLER ECHO COLOR FLOW MAPG: CPT | Mod: 26 | Performed by: INTERNAL MEDICINE

## 2022-06-20 PROCEDURE — 93010 ELECTROCARDIOGRAM REPORT: CPT | Mod: 59 | Performed by: INTERNAL MEDICINE

## 2022-06-20 PROCEDURE — 85027 COMPLETE CBC AUTOMATED: CPT

## 2022-06-20 PROCEDURE — 93005 ELECTROCARDIOGRAM TRACING: CPT | Performed by: INTERNAL MEDICINE

## 2022-06-20 PROCEDURE — 99152 MOD SED SAME PHYS/QHP 5/>YRS: CPT | Performed by: INTERNAL MEDICINE

## 2022-06-20 PROCEDURE — 160046 HCHG PACU - 1ST 60 MINS PHASE II

## 2022-06-20 PROCEDURE — 80048 BASIC METABOLIC PNL TOTAL CA: CPT

## 2022-06-20 PROCEDURE — 160035 HCHG PACU - 1ST 60 MINS PHASE I

## 2022-06-20 PROCEDURE — 99153 MOD SED SAME PHYS/QHP EA: CPT

## 2022-06-20 RX ORDER — MIDAZOLAM HYDROCHLORIDE 1 MG/ML
.5-2 INJECTION INTRAMUSCULAR; INTRAVENOUS PRN
Status: ACTIVE | OUTPATIENT
Start: 2022-06-20 | End: 2022-06-20

## 2022-06-20 RX ORDER — SODIUM CHLORIDE 9 MG/ML
500 INJECTION, SOLUTION INTRAVENOUS
Status: ACTIVE | OUTPATIENT
Start: 2022-06-20 | End: 2022-06-20

## 2022-06-20 RX ADMIN — MIDAZOLAM 2 MG: 1 INJECTION, SOLUTION INTRAMUSCULAR; INTRAVENOUS at 10:27

## 2022-06-20 RX ADMIN — FENTANYL CITRATE 25 MCG: 50 INJECTION, SOLUTION INTRAMUSCULAR; INTRAVENOUS at 10:45

## 2022-06-20 RX ADMIN — FENTANYL CITRATE 50 MCG: 50 INJECTION, SOLUTION INTRAMUSCULAR; INTRAVENOUS at 10:27

## 2022-06-20 RX ADMIN — MIDAZOLAM 2 MG: 1 INJECTION, SOLUTION INTRAMUSCULAR; INTRAVENOUS at 10:30

## 2022-06-20 RX ADMIN — FENTANYL CITRATE 50 MCG: 50 INJECTION, SOLUTION INTRAMUSCULAR; INTRAVENOUS at 10:30

## 2022-06-20 RX ADMIN — MIDAZOLAM 2 MG: 1 INJECTION, SOLUTION INTRAMUSCULAR; INTRAVENOUS at 10:45

## 2022-06-20 ASSESSMENT — PAIN DESCRIPTION - PAIN TYPE
TYPE: ACUTE PAIN
TYPE: SURGICAL PAIN
TYPE: ACUTE PAIN

## 2022-06-20 NOTE — PROGRESS NOTES
Med Rec completed per patient   Allergies reviewed  No ORAL antibiotics in last 30 days    Patient started taking Amiodarone yesterday 6/19/2022

## 2022-06-20 NOTE — OR NURSING
Pt arrived to PACU II at 1245. Pt aa/ox4, VSS. Discharge criteria met. Pain is 2/10 which patient states is tolerable. Pt changed into clothing with assistance. Discharge instructions given; pt and family verbalized understanding and questions answered.  IV removed, tip intact. Will follow-up with cardiology in 1-2 weeks. Pt discharged home and escorted via w/c with CNA in stable condition.

## 2022-06-20 NOTE — PROCEDURES
Electrical Cardioversion    Date/Time: 6/20/2022 10:50 AM  Performed by: Krystle Maher M.D.  Authorized by: Krystle Maher M.D.     Consent:     Consent obtained:  Written    Consent given by:  Patient    Risks discussed:  Induced arrhythmia, cutaneous burn, death and pain    Alternatives discussed:  Rate-control medication  Sedation:     Patient sedated: Yes      Sedation type:  Moderate (conscious) sedation    Sedation:  Versed 4mg IV, Versed 2mg, Fentanyl 100mcg and Fentanyl 25mcg    Sedation start:  6/20/2022 10:27 AM    Sedation end:  6/20/2022 10:50 AM    Vital signs: Vital signs monitored during sedation    Pre-procedure details:     Cardioversion basis:  Elective    Rhythm:  Atrial fibrillation    Anticoagulation status:  Apixaban  Attempt one:     Cardioversion mode:  Synchronous    Waveform:  Biphasic    Shock (Joules):  200    Shock outcome:  Conversion to normal sinus rhythm  Post-procedure details:     Patient status:  Awake    Patient tolerance of procedure:  Tolerated well, no immediate complications      AURA prior.

## 2022-06-20 NOTE — OR NURSING
Pt is aaox4, resting comfortably in [facility] on room air. Respirations are equal and unlabored, she is in no acute distress. She does not complain of any pain at this time. She tolerates ice chips and water without difficulty or complaints of n/v. Pt's cardiac rhythm remains in NSR while in pacu.     Pt's daughter is called for an update on plan of care, status and time frame w/ all questions answered.

## 2022-06-20 NOTE — PROGRESS NOTES
Patient in pre-op, assessment completed, patient and daughter Elba updated on plan of care, all questions answered, no further needs at this time, call light within reach.

## 2022-06-20 NOTE — DISCHARGE INSTRUCTIONS
ACTIVITY: Rest and take it easy for the first 24 hours.  A responsible adult is recommended to remain with you during that time.  It is normal to feel sleepy.  We encourage you to not do anything that requires balance, judgment or coordination.    MILD FLU-LIKE SYMPTOMS ARE NORMAL. YOU MAY EXPERIENCE GENERALIZED MUSCLE ACHES, THROAT IRRITATION, HEADACHE AND/OR SOME NAUSEA.    FOR 24 HOURS DO NOT:  Drive, operate machinery or run household appliances.  Drink beer or alcoholic beverages.   Make important decisions or sign legal documents.    SPECIAL INSTRUCTIONS:   Electrical Cardioversion, Care After  This sheet gives you information about how to care for yourself after your procedure. Your health care provider may also give you more specific instructions. If you have problems or questions, contact your health care provider.  What can I expect after the procedure?  After the procedure, it is common to have:  Some redness on the skin where the shocks were given.  Follow these instructions at home:  Do not drive for 24 hours if you were given a medicine to help you relax (sedative).  Take over-the-counter and prescription medicines only as told by your health care provider.  Ask your health care provider how to check your pulse. Check it often.  Rest for 48 hours after the procedure or as told by your health care provider.  Avoid or limit your caffeine use as told by your health care provider.  Contact a health care provider if:  You feel like your heart is beating too quickly or your pulse is not regular.  You have a serious muscle cramp that does not go away.  Get help right away if:    You have discomfort in your chest.  You are dizzy or you feel faint.  You have trouble breathing or you are short of breath.  Your speech is slurred.  You have trouble moving an arm or leg on one side of your body.  Your fingers or toes turn cold or blue.    [facility]A HOME CARE INSTRUCTIONS    A[facility]A - TRANSESOPHAGEAL ECHOCARDIOGRAM  1.  Don't drive or drink alcohol for 24 hours. The medication you received will make you too drowsy.  2. If you begin to vomit bloody material, or develop black or bloody stools, call your doctor as soon as possible.  3. If you have any neck, chest, abdominal pain or temp of 100 degrees, call your doctor.  4. See your doctor and call for appointment      You should call 911 if you develop problems with breathing or chest pain.  If any questions arise, call your doctor. If your doctor is not available, please feel free to call . You can also call the [facility] HOTLINE open 24 hours/day, 7 days/week and speak to a nurse at [phone], or toll free [phone].    I acknowledge receipt and understanding of these Home Care Instructions.      DIET: To avoid nausea, slowly advance diet as tolerated, avoiding spicy or greasy foods for the first day.  Add more substantial food to your diet according to your physician's instructions.  INCREASE FLUIDS AND FIBER TO AVOID CONSTIPATION.    SURGICAL DRESSING/BATHING: ok to shower    FOLLOW-UP APPOINTMENT:  A follow-up appointment should be arranged with your doctor in 1-2 weeks; call to schedule.    You should CALL YOUR PHYSICIAN if you develop:  Fever greater than 101 degrees F.  Pain not relieved by medication, or persistent nausea or vomiting.  Excessive bleeding (blood soaking through dressing) or unexpected drainage from the wound.  Extreme redness or swelling around the incision site, drainage of pus or foul smelling drainage.  Inability to urinate or empty your bladder within 8 hours.  Problems with breathing or chest pain.    You should call 911 if you develop problems with breathing or chest pain.  If you are unable to contact your doctor or surgical center, you should go to the nearest emergency room or urgent care center.  Physician's telephone #: Cardiology ([phone])    If any questions arise, call your doctor.  If your doctor is not available, please feel free  to call the Surgical Center at [phone].     A registered nurse may call you a few days after your surgery to see how you are doing after your procedure.    MEDICATIONS: Resume taking daily medication.  Take prescribed pain medication with food.  If no medication is prescribed, you may take non-aspirin pain medication if needed.  PAIN MEDICATION CAN BE VERY CONSTIPATING.  Take a stool softener or laxative such as senokot, pericolace, or milk of magnesia if needed.      If your physician has prescribed pain medication that includes Acetaminophen (Tylenol), do not take additional Acetaminophen (Tylenol) while taking the prescribed medication.    Depression / Suicide Risk    As you are discharged from this [facility] facility, it is important to learn how to keep safe from harming yourself.    Recognize the warning signs:  Abrupt changes in personality, positive or negative- including increase in energy   Giving away possessions  Change in eating patterns- significant weight changes-  positive or negative  Change in sleeping patterns- unable to sleep or sleeping all the time   Unwillingness or inability to communicate  Depression  Unusual sadness, discouragement and loneliness  Talk of wanting to die  Neglect of personal appearance   Rebelliousness- reckless behavior  Withdrawal from people/activities they love  Confusion- inability to concentrate     If you or a loved one observes any of these behaviors or has concerns about self-harm, here's what you can do:  Talk about it- your feelings and reasons for harming yourself  Remove any means that you might use to hurt yourself (examples: pills, rope, extension cords, firearm)  Get professional help from the community (Mental Health, Substance Abuse, psychological counseling)  Do not be alone:Call your Safe Contact- someone whom you trust who will be there for you.  Call your local CRISIS HOTLINE 354-1215 or [phone]  Call your local Children's Mobile Crisis  Response Team [facility] (990) 268-0701 or www.[employer]  Call the toll free National Suicide Prevention Hotlines   National Suicide Prevention Lifeline 560-871-WSFY (4251)  National [employer] Network 800-SUICIDE (711-8615)

## 2022-06-21 ENCOUNTER — TELEPHONE (OUTPATIENT)
Dept: CARDIOLOGY | Facility: MEDICAL CENTER | Age: 82
End: 2022-06-21
Payer: MEDICARE

## 2022-06-21 RX ORDER — DILTIAZEM HYDROCHLORIDE 120 MG/1
120 CAPSULE, COATED, EXTENDED RELEASE ORAL EVERY MORNING
Qty: 28 CAPSULE | Refills: 0 | Status: SHIPPED | OUTPATIENT
Start: 2022-06-21 | End: 2022-09-28

## 2022-06-21 NOTE — TELEPHONE ENCOUNTER
Call pt tomorrow  Received: Yesterday  Krystle Maher M.D.  Brooke Damian R.N.  Please call pt tomorrow, have her reduce her Cardizem from her 240 mg dose to 120 mg dose for 4 weeks, then stop altogether.  Please send her 28 tablets with no refills.  Please make sure she is completely off of the digoxin.  She will not need her diltiazem 240 mg for now that she may may need it later so tell her not to throw it away.     Is the amiodarone becomes more potent, I am hoping she does not need the Cardizem.  If she goes back in atrial fibrillation, she can take Cardizem 120 mg twice daily for 2 days to see if she can convert back to normal rhythm.     She is trying to get onto my chart.  Please make sure she has the phone number to call for any questions or concerns.  Neck step would be referral to EP cardiology.  Thank you.   ------------------------------------------------------------------------------------------------    LVM asking pt to call back to discuss.

## 2022-06-22 NOTE — TELEPHONE ENCOUNTER
Called pt again, she was in the store and could not hear well. She stated it was ok to hang up, call her back, and leave a VM with LS's instructions. Detailed VM left for pt w/ recommendations, provided call back number for any questions.

## 2022-06-22 NOTE — TELEPHONE ENCOUNTER
Pt returned call. Clarified that Diltiazem is the same medication as Cardizem and this is what she is supposed to reduce to 120 mg daily, pt verbalizes understanding.

## 2022-09-07 ENCOUNTER — TELEPHONE (OUTPATIENT)
Dept: CARDIOLOGY | Facility: MEDICAL CENTER | Age: 82
End: 2022-09-07
Payer: MEDICARE

## 2022-09-07 NOTE — TELEPHONE ENCOUNTER
LUZ    Caller: - Pham    Topic/issue: Pham has some new Keto tablets, she bought, and is wondering if they are ok for her to take    Callback Number: [phone]    Thank you,   Radha ORTIZ

## 2022-09-28 ENCOUNTER — OFFICE VISIT (OUTPATIENT)
Dept: CARDIOLOGY | Facility: PHYSICIAN GROUP | Age: 82
End: 2022-09-28
Payer: MEDICARE

## 2022-09-28 VITALS
DIASTOLIC BLOOD PRESSURE: 64 MMHG | HEIGHT: 63 IN | OXYGEN SATURATION: 98 % | SYSTOLIC BLOOD PRESSURE: 118 MMHG | RESPIRATION RATE: 14 BRPM | HEART RATE: 60 BPM | BODY MASS INDEX: 30 KG/M2 | WEIGHT: 169.31 LBS

## 2022-09-28 DIAGNOSIS — I48.19 PERSISTENT ATRIAL FIBRILLATION (HCC): ICD-10-CM

## 2022-09-28 DIAGNOSIS — E78.2 MIXED HYPERLIPIDEMIA: ICD-10-CM

## 2022-09-28 DIAGNOSIS — Z86.73 HISTORY OF TIA (TRANSIENT ISCHEMIC ATTACK): ICD-10-CM

## 2022-09-28 DIAGNOSIS — I50.32 CHRONIC HEART FAILURE WITH PRESERVED EJECTION FRACTION (HFPEF) (HCC): ICD-10-CM

## 2022-09-28 DIAGNOSIS — I51.9 LV DYSFUNCTION: ICD-10-CM

## 2022-09-28 PROCEDURE — 93000 ELECTROCARDIOGRAM COMPLETE: CPT | Performed by: NURSE PRACTITIONER

## 2022-09-28 PROCEDURE — 99214 OFFICE O/P EST MOD 30 MIN: CPT | Mod: 25 | Performed by: NURSE PRACTITIONER

## 2022-09-28 RX ORDER — POTASSIUM CHLORIDE 20 MEQ/1
20 TABLET, EXTENDED RELEASE ORAL 2 TIMES DAILY WITH MEALS
Qty: 200 TABLET | Refills: 3 | Status: SHIPPED | OUTPATIENT
Start: 2022-09-28 | End: 2023-09-25

## 2022-09-28 ASSESSMENT — ENCOUNTER SYMPTOMS
FEVER: 0
ABDOMINAL PAIN: 0
NAUSEA: 0
PALPITATIONS: 0
DIZZINESS: 0
MYALGIAS: 0
CHILLS: 0
COUGH: 0
SHORTNESS OF BREATH: 0
HEADACHES: 0
LOSS OF CONSCIOUSNESS: 0
ORTHOPNEA: 0
INSOMNIA: 0
PND: 0
BRUISES/BLEEDS EASILY: 0

## 2022-09-28 ASSESSMENT — FIBROSIS 4 INDEX: FIB4 SCORE: 2.75

## 2022-09-28 NOTE — PROGRESS NOTES
Chief Complaint   Patient presents with    Follow-Up    Atrial Fibrillation     AURA/DCCV on 2022       Subjective     Pham Stafford is a 82 y.o. female who presents today for three month follow-up of PAFib.    Pham is an 82 year old female with history of PAFibrillation/flutter, anticoagulation, history of prior TIA, hyperlipidemia, and tricuspid regurgitation, first seen by Dr. Maher in 2022 to establish care.  EKG at that time showed atrial fibrillation, and she was started on Amiodarone (Diltiazem was discontinued) and she had AURA/DCCV on 2022, which restored sinus rhythm. Lipitor was also increased from 40mg to 80mg, for better lipid control.    She is here today for three month follow-up. She has been feeling quite good. No symptomatic palpitations. No chest pain, pressure or discomfort; no shortness of breath, orthopnea or PND; no dizziness or syncope; improved LE edema. BP has been stable. No bleeding issues.    Past Medical History:   Diagnosis Date    (HFpEF) heart failure with preserved ejection fraction (HCC) 2022    Echocardiogram with normal LV size, LVEF 50%. Mild LA enlargement. Mild MR. Moderate-severe TR. RVSP 41mmHg.    Bowel habit changes     diarrhea    Bronchitis     Cataract     Bilateral IOL    Chronic anticoagulation     Dental disorder     Upper denture    Diabetes (HCC)     GERD (gastroesophageal reflux disease)     Hyperlipidemia     Persistent atrial fibrillation (HCC)     Pneumonia 2020    Stroke (HCC) 2018    TIA, no deficits    Typical atrial flutter (HCC)     Urinary incontinence      Past Surgical History:   Procedure Laterality Date    BLADDER SLING FEMALE      CATARACT EXTRACTION WITH IOL Bilateral     OOPHORECTOMY Left     with partial colectomy     Family History   Problem Relation Age of Onset    Stroke Mother          of CVA at 82    Heart Disease Father         MI? 60s    Lung Disease Father          of COPD at 76     Social History      Socioeconomic History    Marital status:      Spouse name: Not on file    Number of children: Not on file    Years of education: Not on file    Highest education level: Not on file   Occupational History    Not on file   Tobacco Use    Smoking status: Former     Packs/day: 1.00     Years: 20.00     Pack years: 20.00     Types: Cigarettes     Quit date: 1992     Years since quittin.7    Smokeless tobacco: Former    Tobacco comments:     quit    Substance and Sexual Activity    Alcohol use: Yes     Alcohol/week: 1.2 - 1.8 oz     Types: 1 Glasses of wine, 1 - 2 Cans of beer per week     Comment: 1 drink day    Drug use: Never    Sexual activity: Not on file   Other Topics Concern    Not on file   Social History Narrative    Not on file     Social Determinants of Health     Financial Resource Strain: Not on file   Food Insecurity: Not on file   Transportation Needs: Not on file   Physical Activity: Not on file   Stress: Not on file   Social Connections: Not on file   Intimate Partner Violence: Not on file   Housing Stability: Not on file     No Known Allergies  Outpatient Encounter Medications as of 2022   Medication Sig Dispense Refill    potassium chloride SA (KDUR) 20 MEQ Tab CR Take 1 Tablet by mouth 2 times a day with meals. 200 Tablet 3    amiodarone (CORDARONE) 200 MG Tab Take 1 Tablet by mouth see administration instructions. Amiodarone 400 mg twice daily for 2 weeks, 200 mg twice daily for 2 weeks, 200 mg daily.  Start medication on 2022. 200 Tablet 3    pantoprazole (PROTONIX) 40 MG Tablet Delayed Response Take 40 mg by mouth every morning.      ELIQUIS 5 MG Tab Take 5 mg by mouth 2 times a day.      CALCIUM PO Take 1 Tablet by mouth every morning.      Multiple Vitamin (MULTI VITAMIN PO) Take 1 Tablet by mouth every morning.      Multiple Vitamins-Minerals (LUTEIN-ZEAXANTHIN PO) Take 1 Tablet by mouth every morning.      furosemide (LASIX) 20 MG Tab Take 40 mg by mouth every  "morning. 180 Tablet 7    atorvastatin (LIPITOR) 80 MG tablet Take 1 Tablet by mouth every evening. 90 Tablet 7    [DISCONTINUED] DILTIAZem CD (CARDIZEM CD) 120 MG CAPSULE SR 24 HR Take 1 Capsule by mouth every morning. 28 Capsule 0    [DISCONTINUED] potassium chloride SA (KDUR) 20 MEQ Tab CR Take 1 Tablet by mouth 2 times a day with meals.       No facility-administered encounter medications on file as of 9/28/2022.     Review of Systems   Constitutional:  Negative for chills and fever.   HENT:  Negative for congestion.    Respiratory:  Negative for cough and shortness of breath.    Cardiovascular:  Negative for chest pain, palpitations, orthopnea, leg swelling and PND.   Gastrointestinal:  Negative for abdominal pain and nausea.   Musculoskeletal:  Negative for myalgias.   Skin:  Negative for rash.   Neurological:  Negative for dizziness, loss of consciousness and headaches.   Endo/Heme/Allergies:  Does not bruise/bleed easily.   Psychiatric/Behavioral:  The patient does not have insomnia.             Objective     /64 (BP Location: Left arm, Patient Position: Sitting, BP Cuff Size: Adult)   Pulse 60   Resp 14   Ht 1.6 m (5' 3\")   Wt 76.8 kg (169 lb 5 oz)   SpO2 98%   BMI 29.99 kg/m²     Physical Exam  Constitutional:       Appearance: She is well-developed.   HENT:      Head: Normocephalic.   Neck:      Vascular: No JVD.   Cardiovascular:      Rate and Rhythm: Normal rate and regular rhythm.      Heart sounds: Normal heart sounds.   Pulmonary:      Effort: Pulmonary effort is normal. No respiratory distress.      Breath sounds: Normal breath sounds. No wheezing or rales.   Abdominal:      General: Bowel sounds are normal. There is no distension.      Palpations: Abdomen is soft.      Tenderness: There is no abdominal tenderness.   Musculoskeletal:         General: Normal range of motion.      Cervical back: Normal range of motion and neck supple.   Skin:     General: Skin is warm and dry.      " Findings: No rash.   Neurological:      Mental Status: She is alert and oriented to person, place, and time.   Psychiatric:         Mood and Affect: Mood normal.         Behavior: Behavior normal.     EKG ordered and interpreted by me today reveals sinus bradycardia at 58bpm.    Conclusions of Echocardiogram of 1/17/2022 ([facility]):  Normal LV cavity size. Normal LV wall thickness. Borderline reduced LV systolic function. Estimated LVEF of 50%. Indeterminate LV diastolic function.   Normal right ventricular size and systolic function   Mild left atrial enlargement.   Normal right atrial size.   Interatrial septum appears intact. No interatrial shunt by bubble study.   Mild mitral annular calcification. Mildly thickened mitral valve leaflets with normal leaflet mobility. Mild mitral regurgitation.   Mild aortic valve thickening with normal leaflet mobility.   Normal appearing tricuspid valve. Moderate to severe tricuspid regurgitation. Estimated RVSP of 41 mm Hg suggests mild pulmonary hypertension.   Mild pulmonic insufficiency.   No pericardial effusion.   Inferior vena cava is normal in size with a normal collapsibility index, suggesting normal RA pressure.   Persistent atrial flutter.      LABS AS OF 6/17/2022:  Cholesterol,Tot 100 - 199 mg/dL 178    Triglycerides 0 - 149 mg/dL 155 High     HDL >39 mg/dL 47    VLDL, Calc 5 - 40 mg/dL 27    LDL Cholesterol 0 - 99 mg/dL 104 High       Glucose 65 - 99 mg/dL 146 High     Bun 8 - 27 mg/dL 17    Creatinine 0.57 - 1.00 mg/dL 1.26 High     eGFR >59 mL/min/1.73 42 Low     Bun-Creatinine Ratio 12 - 28 13    Sodium 134 - 144 mmol/L 144    Potassium 3.5 - 5.2 mmol/L 4.6    Chloride 96 - 106 mmol/L 104    Co2 20 - 29 mmol/L 24    Calcium 8.7 - 10.3 mg/dL 9.4    Total Protein 6.0 - 8.5 g/dL 6.5    Albumin 3.6 - 4.6 g/dL 4.1    Globulin 1.5 - 4.5 g/dL 2.4    Ag Ratio 1.2 - 2.2 1.7    Total Bilirubin 0.0 - 1.2 mg/dL 0.6    Alkaline Phosphatase 44 - 121 IU/L 106     AST(SGOT) 0 - 40 IU/L 23    ALT(SGPT) 0 - 32 IU/L 18        Assessment & Plan     1. Persistent atrial fibrillation (HCC)  EKG      2. Chronic heart failure with preserved ejection fraction (HFpEF) (HCC)  potassium chloride SA (KDUR) 20 MEQ Tab CR      3. LV dysfunction        4. Mixed hyperlipidemia  Lipid Profile    Comp Metabolic Panel      5. History of TIA (transient ischemic attack)            Medical Decision Making: Today's Assessment/Status/Plan:      1. History of atrial fibrillation, status post AURA/DCCV on 6/20/2022, on Amiodarone, now in sinus rhythm. She feels beteter.    2. History of HFpEF, LVEF 50%, on Amiodarone, Eliquis, Lasix and KCL, along with statin. No overt HF symptoms.    3. Hyperlipidemia, on Lipitor 80mg. To repeat fasting CMP and lipid.    4. History of LV dysfunction, while in atrial flutter.    5. History of TIA, on Eliquis and statin.    She is doing well. She will be returning to [facility] for the winter, and returning in spring 2023. Same medications for now. Follow-up in May 2023; she does have cardiology contact in [place] too.

## 2022-09-29 LAB — EKG IMPRESSION: NORMAL

## 2022-11-09 ENCOUNTER — PATIENT MESSAGE (OUTPATIENT)
Dept: HEALTH INFORMATION MANAGEMENT | Facility: OTHER | Age: 82
End: 2022-11-09

## 2022-11-30 ENCOUNTER — TELEPHONE (OUTPATIENT)
Dept: CARDIOLOGY | Facility: MEDICAL CENTER | Age: 82
End: 2022-11-30
Payer: MEDICARE

## 2022-11-30 NOTE — TELEPHONE ENCOUNTER
AB    Caller: Pham Stafford    Topic/issue: ELIQUIS    Patient states that she would like to know if there is an alternative medication to ELIQUIS. Please advise.    Thank you,  Silvino SHELL    Callback Number: [phone] (home)

## 2022-12-02 NOTE — TELEPHONE ENCOUNTER
Left msg for pt to call back, also left contact information for [facility] medication assistance program if her issue is cost related and she would like to try this. Will discuss her issue further if pt calls back.

## 2023-05-03 ENCOUNTER — TELEPHONE (OUTPATIENT)
Dept: CARDIOLOGY | Facility: MEDICAL CENTER | Age: 83
End: 2023-05-03

## 2023-05-03 ENCOUNTER — OFFICE VISIT (OUTPATIENT)
Dept: CARDIOLOGY | Facility: PHYSICIAN GROUP | Age: 83
End: 2023-05-03
Payer: MEDICARE

## 2023-05-03 VITALS
RESPIRATION RATE: 12 BRPM | BODY MASS INDEX: 30.47 KG/M2 | HEIGHT: 63 IN | OXYGEN SATURATION: 95 % | SYSTOLIC BLOOD PRESSURE: 122 MMHG | HEART RATE: 64 BPM | DIASTOLIC BLOOD PRESSURE: 78 MMHG | WEIGHT: 171.96 LBS

## 2023-05-03 DIAGNOSIS — I50.32 CHRONIC HEART FAILURE WITH PRESERVED EJECTION FRACTION (HFPEF) (HCC): ICD-10-CM

## 2023-05-03 DIAGNOSIS — I48.0 PAROXYSMAL ATRIAL FIBRILLATION (HCC): ICD-10-CM

## 2023-05-03 DIAGNOSIS — D68.318 CIRCULATING ANTICOAGULANTS (HCC): ICD-10-CM

## 2023-05-03 DIAGNOSIS — Z86.73 HISTORY OF TIA (TRANSIENT ISCHEMIC ATTACK): ICD-10-CM

## 2023-05-03 DIAGNOSIS — R73.01 IMPAIRED FASTING GLUCOSE: ICD-10-CM

## 2023-05-03 DIAGNOSIS — Z79.899 HIGH RISK MEDICATION USE: ICD-10-CM

## 2023-05-03 DIAGNOSIS — E78.2 MIXED HYPERLIPIDEMIA: ICD-10-CM

## 2023-05-03 DIAGNOSIS — I48.19 PERSISTENT ATRIAL FIBRILLATION (HCC): ICD-10-CM

## 2023-05-03 DIAGNOSIS — I51.9 LV DYSFUNCTION: ICD-10-CM

## 2023-05-03 PROCEDURE — 99214 OFFICE O/P EST MOD 30 MIN: CPT | Performed by: NURSE PRACTITIONER

## 2023-05-03 RX ORDER — FUROSEMIDE 20 MG/1
40 TABLET ORAL
COMMUNITY
Start: 2023-04-10

## 2023-05-03 RX ORDER — AMIODARONE HYDROCHLORIDE 200 MG/1
200 TABLET ORAL SEE ADMIN INSTRUCTIONS
Qty: 200 TABLET | Refills: 3 | Status: SHIPPED | OUTPATIENT
Start: 2023-05-03 | End: 2023-08-31 | Stop reason: SDUPTHER

## 2023-05-03 RX ORDER — APIXABAN 5 MG/1
5 TABLET, FILM COATED ORAL 2 TIMES DAILY
Qty: 200 TABLET | Refills: 3 | Status: SHIPPED | OUTPATIENT
Start: 2023-05-03

## 2023-05-03 RX ORDER — ATORVASTATIN CALCIUM 80 MG/1
80 TABLET, FILM COATED ORAL NIGHTLY
Qty: 100 TABLET | Refills: 3 | Status: SHIPPED | OUTPATIENT
Start: 2023-05-03

## 2023-05-03 ASSESSMENT — ENCOUNTER SYMPTOMS
LOSS OF CONSCIOUSNESS: 0
PND: 0
HEADACHES: 0
INSOMNIA: 0
MYALGIAS: 0
SHORTNESS OF BREATH: 0
ABDOMINAL PAIN: 0
PALPITATIONS: 0
ORTHOPNEA: 0
CHILLS: 0
BRUISES/BLEEDS EASILY: 0
FEVER: 0
NAUSEA: 0
COUGH: 0
DIZZINESS: 0

## 2023-05-03 ASSESSMENT — FIBROSIS 4 INDEX: FIB4 SCORE: 2.44

## 2023-05-03 NOTE — PROGRESS NOTES
Chief Complaint   Patient presents with    Atrial Fibrillation    Anticoagulation    CHF (Compensated)    Hyperlipidemia              Subjective     Pham Stafford is a 82 y.o. female who presents today for six month follow-up of PAFib, anticoagulation, prior TIA, hyperlipidemia, and TR.    Pham is an 82 year old female with history of PAFibrillation/flutter, anticoagulation, history of prior TIA, hyperlipidemia, and tricuspid regurgitation, last seen by me in 2022.     In 2022, she was started on Amiodarone, and had AURA/DCCV, which restored sinus rhythm.     Since the last visit, she spent some time in [facility]; she is followed by Cardiology at [facility], and was seen last month.    She is here today for six month follow-up. Generally, she is doing well: No sustained, symptomatic palpitations. No chest pain, pressure or discomfort; no shortness of breath, orthopnea or PND; no dizziness or syncope; minmal LE edema. BP has been stable. No bleeding issues. She remains on Lasix 20mg BID.    Past Medical History:   Diagnosis Date    (HFpEF) heart failure with preserved ejection fraction (HCC) 2022    Echocardiogram with normal LV size, LVEF 50%. Mild LA enlargement. Mild MR. Moderate-severe TR. RVSP 41mmHg.    Bowel habit changes     diarrhea    Bronchitis     Cataract     Bilateral IOL    Chronic anticoagulation     Dental disorder     Upper denture    Diabetes (HCC)     GERD (gastroesophageal reflux disease)     Hyperlipidemia     Persistent atrial fibrillation (HCC)     Pneumonia 2020    Stroke (HCC) 2018    TIA, no deficits    Typical atrial flutter (HCC)     Urinary incontinence      Past Surgical History:   Procedure Laterality Date    BLADDER SLING FEMALE      CATARACT EXTRACTION WITH IOL Bilateral     OOPHORECTOMY Left     with partial colectomy     Family History   Problem Relation Age of Onset    Stroke Mother          of CVA at 82    Heart Disease Father         MI?  60s    Lung Disease Father          of COPD at 76     Social History     Socioeconomic History    Marital status:      Spouse name: Not on file    Number of children: Not on file    Years of education: Not on file    Highest education level: Not on file   Occupational History    Not on file   Tobacco Use    Smoking status: Former     Packs/day: 1.00     Years: 20.00     Pack years: 20.00     Types: Cigarettes     Quit date: 1992     Years since quittin.3    Smokeless tobacco: Former    Tobacco comments:     quit    Substance and Sexual Activity    Alcohol use: Yes     Alcohol/week: 1.2 - 1.8 oz     Types: 1 Glasses of wine, 1 - 2 Cans of beer per week     Comment: 1 drink day    Drug use: Never    Sexual activity: Not on file   Other Topics Concern    Not on file   Social History Narrative    Not on file     Social Determinants of Health     Financial Resource Strain: Not on file   Food Insecurity: Not on file   Transportation Needs: Not on file   Physical Activity: Not on file   Stress: Not on file   Social Connections: Not on file   Intimate Partner Violence: Not on file   Housing Stability: Not on file     No Known Allergies  Outpatient Encounter Medications as of 5/3/2023   Medication Sig Dispense Refill    furosemide (LASIX) 20 MG Tab Take 40 mg by mouth.      ELIQUIS 5 MG Tab Take 1 Tablet by mouth 2 times a day. 200 Tablet 3    amiodarone (CORDARONE) 200 MG Tab Take 1 Tablet by mouth see administration instructions. 200 Tablet 3    atorvastatin (LIPITOR) 80 MG tablet Take 1 Tablet by mouth every evening. 100 Tablet 3    potassium chloride SA (KDUR) 20 MEQ Tab CR Take 1 Tablet by mouth 2 times a day with meals. 200 Tablet 3    pantoprazole (PROTONIX) 40 MG Tablet Delayed Response Take 40 mg by mouth every morning.      CALCIUM PO Take 1 Tablet by mouth every morning.      Multiple Vitamin (MULTI VITAMIN PO) Take 1 Tablet by mouth every morning.      [DISCONTINUED] amiodarone (CORDARONE)  "200 MG Tab Take 1 Tablet by mouth see administration instructions. Amiodarone 400 mg twice daily for 2 weeks, 200 mg twice daily for 2 weeks, 200 mg daily.  Start medication on 6/19/2022. 200 Tablet 3    [DISCONTINUED] ELIQUIS 5 MG Tab Take 5 mg by mouth 2 times a day.      [DISCONTINUED] Multiple Vitamins-Minerals (LUTEIN-ZEAXANTHIN PO) Take 1 Tablet by mouth every morning.      [DISCONTINUED] furosemide (LASIX) 20 MG Tab Take 40 mg by mouth every morning. (Patient not taking: Reported on 5/3/2023) 180 Tablet 7    [DISCONTINUED] atorvastatin (LIPITOR) 80 MG tablet Take 1 Tablet by mouth every evening. 90 Tablet 7     No facility-administered encounter medications on file as of 5/3/2023.     Review of Systems   Constitutional:  Negative for chills and fever.   HENT:  Negative for congestion.    Respiratory:  Negative for cough and shortness of breath.    Cardiovascular:  Negative for chest pain, palpitations, orthopnea, leg swelling and PND.   Gastrointestinal:  Negative for abdominal pain and nausea.   Musculoskeletal:  Negative for myalgias.   Skin:  Negative for rash.   Neurological:  Negative for dizziness, loss of consciousness and headaches.   Endo/Heme/Allergies:  Does not bruise/bleed easily.   Psychiatric/Behavioral:  The patient does not have insomnia.             Objective     /78 (BP Location: Left arm, Patient Position: Sitting, BP Cuff Size: Adult)   Pulse 64   Resp 12   Ht 1.6 m (5' 3\")   Wt 78 kg (171 lb 15.3 oz)   SpO2 95%   BMI 30.46 kg/m²     Physical Exam  Constitutional:       Appearance: She is well-developed.   HENT:      Head: Normocephalic.   Neck:      Vascular: No JVD.   Cardiovascular:      Rate and Rhythm: Normal rate and regular rhythm.      Heart sounds: Normal heart sounds.   Pulmonary:      Effort: Pulmonary effort is normal. No respiratory distress.      Breath sounds: Normal breath sounds. No wheezing or rales.   Abdominal:      General: Bowel sounds are normal. There " is no distension.      Palpations: Abdomen is soft.      Tenderness: There is no abdominal tenderness.   Musculoskeletal:         General: Normal range of motion.      Cervical back: Normal range of motion and neck supple.   Skin:     General: Skin is warm and dry.      Findings: No rash.   Neurological:      Mental Status: She is alert and oriented to person, place, and time.   Psychiatric:         Mood and Affect: Mood normal.         Behavior: Behavior normal.     EKG ordered and interpreted by me today reveals sinus bradycardia at 56bpm with no acute ST-T wave changes.    Conclusions of Echocardiogram of 1/17/2022 ([facility]):  Normal LV cavity size. Normal LV wall thickness. Borderline reduced LV systolic function. Estimated LVEF of 50%. Indeterminate LV diastolic function.   Normal right ventricular size and systolic function   Mild left atrial enlargement.   Normal right atrial size.   Interatrial septum appears intact. No interatrial shunt by bubble study.   Mild mitral annular calcification. Mildly thickened mitral valve leaflets with normal leaflet mobility. Mild mitral regurgitation.   Mild aortic valve thickening with normal leaflet mobility.   Normal appearing tricuspid valve. Moderate to severe tricuspid regurgitation. Estimated RVSP of 41 mm Hg suggests mild pulmonary hypertension.   Mild pulmonic insufficiency.   No pericardial effusion.   Inferior vena cava is normal in size with a normal collapsibility index, suggesting normal RA pressure.   Persistent atrial flutter.      LABS AS OF 4/26/2023:  Glucose 125  BUN 15  Creatinine 1.28  GFR 41  Potassium 4.2  AST 26  ALT 19  Cholesterol 187  Triglycerides 86  HDL 81  LDL 91    Assessment & Plan     1. Persistent atrial fibrillation (HCC)  EKG    ELIQUIS 5 MG Tab    amiodarone (CORDARONE) 200 MG Tab    EC-ECHOCARDIOGRAM COMPLETE W/O CONT      2. Paroxysmal atrial fibrillation (HCC)        3. High risk medication use        4. Circulating  anticoagulants (HCC)        5. Chronic heart failure with preserved ejection fraction (HFpEF) (HCC)  EC-ECHOCARDIOGRAM COMPLETE W/O CONT      6. LV dysfunction        7. Mixed hyperlipidemia  atorvastatin (LIPITOR) 80 MG tablet      8. History of TIA (transient ischemic attack)        9. Impaired fasting glucose            Medical Decision Making: Today's Assessment/Status/Plan:        1. History of atrial fibrillation, status post AURA/DCCV in June 2022, on Amiodarone 200mg once daily, now in sinus rhythm. EKG today reveals sinus rhythm.  Recent LFTs were normal; will check TSH given Amiodarone use.    2. Chronic anticoagulation with Eliquis, no bleeing problems.     3. History of HFpEF, LVEF 50%, on Amiodarone, Eliquis, Lasix and KCL, along with statin. No overt HF symptoms. Will repeat echocardiogram.     4. History of LV dysfunction, while in atrial flutter/fibrillation. Last echo showed LVEF 50%; to repeat echocardiogram.    5. Hyperlipidemia, on Lipitor 80mg. Lipid panel is good/stable.     6. History of TIA, on Eliquis and statin. No recurrence of symptoms.    7. Hyperglycemia, to check fasting HgbA1c.     She is doing well and remains stable.  Will check TSH and HgbA1c.     She will be returning to [facility] in late October/early November 2023. Same medications for now. Follow-up echo in early October, with follow-up appointment afterwards. She does follow-up with [employer] too.

## 2023-05-03 NOTE — TELEPHONE ENCOUNTER
S/W pt, informed of results and plan as stated by AB. Pt agrees to have labs done, will go to outside lab and needs orders mailed, note to MA.

## 2023-05-03 NOTE — TELEPHONE ENCOUNTER
----- Message from WOODY Vicente sent at 5/3/2023  2:47 PM PDT -----  Regarding: Lab results  Alexis,    I just saw this patient in [facility] today - we got the labs after she left her appointment.    Please let her know the following:    Lipids are good  Liver is good    Kidney function is down a little bit (due to her diuretics)    Glucose is up - I'd like her to check a fasting HgbA1c.    She also needs a TSH, given Amiodarone use.    I put the order in for both - please print and mail to patient.    Thank you!  AB

## 2023-05-10 ENCOUNTER — HOSPITAL ENCOUNTER (OUTPATIENT)
Dept: LAB | Facility: MEDICAL CENTER | Age: 83
End: 2023-05-10
Attending: NURSE PRACTITIONER
Payer: MEDICARE

## 2023-05-10 DIAGNOSIS — Z79.899 HIGH RISK MEDICATION USE: ICD-10-CM

## 2023-05-10 DIAGNOSIS — R73.01 IMPAIRED FASTING GLUCOSE: ICD-10-CM

## 2023-05-10 DIAGNOSIS — I48.0 PAROXYSMAL ATRIAL FIBRILLATION (HCC): ICD-10-CM

## 2023-05-10 LAB — TSH SERPL DL<=0.005 MIU/L-ACNC: 2.89 UIU/ML (ref 0.38–5.33)

## 2023-05-10 PROCEDURE — 36415 COLL VENOUS BLD VENIPUNCTURE: CPT

## 2023-05-10 PROCEDURE — 84443 ASSAY THYROID STIM HORMONE: CPT

## 2023-05-10 PROCEDURE — 83036 HEMOGLOBIN GLYCOSYLATED A1C: CPT | Mod: GA

## 2023-05-11 LAB
EST. AVERAGE GLUCOSE BLD GHB EST-MCNC: 128 MG/DL
HBA1C MFR BLD: 6.1 % (ref 4–5.6)

## 2023-08-31 DIAGNOSIS — I48.19 PERSISTENT ATRIAL FIBRILLATION (HCC): ICD-10-CM

## 2023-09-01 RX ORDER — AMIODARONE HYDROCHLORIDE 200 MG/1
200 TABLET ORAL DAILY
Qty: 90 TABLET | Refills: 0 | Status: SHIPPED | OUTPATIENT
Start: 2023-09-01 | End: 2023-10-16

## 2023-09-01 NOTE — TELEPHONE ENCOUNTER
Reviewed chart, last OV with AB 5/3/23, noted that pt is continuing with amiodarone 200mg dialy. Pt had recent CMP, did not complete labs ordered by AB at visit that included TSH. Toddt msg sent to pt, courtesy refill sent for 90 day supply.

## 2023-09-24 DIAGNOSIS — I50.32 CHRONIC HEART FAILURE WITH PRESERVED EJECTION FRACTION (HFPEF) (HCC): ICD-10-CM

## 2023-09-25 RX ORDER — POTASSIUM CHLORIDE 20 MEQ/1
20 TABLET, EXTENDED RELEASE ORAL 2 TIMES DAILY WITH MEALS
Qty: 180 TABLET | Refills: 2 | Status: SHIPPED | OUTPATIENT
Start: 2023-09-25

## 2023-09-25 NOTE — TELEPHONE ENCOUNTER
Is the patient due for a refill? Yes    Was the patient seen the past year? Yes    Date of last office visit: 5/3/23    Does the patient have an upcoming appointment?  Yes   If yes, When? 10/18/23    Provider to refill:AB    Does the patients insurance require a 100 day supply?  No

## 2023-10-09 ENCOUNTER — HOSPITAL ENCOUNTER (OUTPATIENT)
Dept: CARDIOLOGY | Facility: MEDICAL CENTER | Age: 83
End: 2023-10-09
Attending: NURSE PRACTITIONER
Payer: MEDICARE

## 2023-10-09 DIAGNOSIS — I48.19 PERSISTENT ATRIAL FIBRILLATION (HCC): ICD-10-CM

## 2023-10-09 DIAGNOSIS — I50.32 CHRONIC HEART FAILURE WITH PRESERVED EJECTION FRACTION (HFPEF) (HCC): ICD-10-CM

## 2023-10-09 LAB
LV EJECT FRACT  99904: 60
LV EJECT FRACT MOD 2C 99903: 59.22
LV EJECT FRACT MOD 4C 99902: 63.74
LV EJECT FRACT MOD BP 99901: 61.05

## 2023-10-09 PROCEDURE — 93306 TTE W/DOPPLER COMPLETE: CPT

## 2023-10-09 PROCEDURE — 93306 TTE W/DOPPLER COMPLETE: CPT | Mod: 26 | Performed by: INTERNAL MEDICINE

## 2023-10-14 DIAGNOSIS — I48.19 PERSISTENT ATRIAL FIBRILLATION (HCC): ICD-10-CM

## 2023-10-16 RX ORDER — AMIODARONE HYDROCHLORIDE 200 MG/1
200 TABLET ORAL DAILY
Qty: 90 TABLET | Refills: 0 | Status: SHIPPED | OUTPATIENT
Start: 2023-10-16 | End: 2023-10-18 | Stop reason: SDUPTHER

## 2023-10-18 ENCOUNTER — OFFICE VISIT (OUTPATIENT)
Dept: CARDIOLOGY | Facility: PHYSICIAN GROUP | Age: 83
End: 2023-10-18
Payer: MEDICARE

## 2023-10-18 VITALS
HEIGHT: 63 IN | DIASTOLIC BLOOD PRESSURE: 76 MMHG | WEIGHT: 164.9 LBS | OXYGEN SATURATION: 97 % | BODY MASS INDEX: 29.22 KG/M2 | RESPIRATION RATE: 12 BRPM | SYSTOLIC BLOOD PRESSURE: 106 MMHG | HEART RATE: 59 BPM

## 2023-10-18 DIAGNOSIS — Z79.01 CHRONIC ANTICOAGULATION: ICD-10-CM

## 2023-10-18 DIAGNOSIS — I50.32 CHRONIC HEART FAILURE WITH PRESERVED EJECTION FRACTION (HFPEF) (HCC): ICD-10-CM

## 2023-10-18 DIAGNOSIS — I48.19 PERSISTENT ATRIAL FIBRILLATION (HCC): ICD-10-CM

## 2023-10-18 DIAGNOSIS — I48.3 TYPICAL ATRIAL FLUTTER (HCC): ICD-10-CM

## 2023-10-18 DIAGNOSIS — E78.2 MIXED HYPERLIPIDEMIA: ICD-10-CM

## 2023-10-18 DIAGNOSIS — Z79.899 HIGH RISK MEDICATION USE: ICD-10-CM

## 2023-10-18 DIAGNOSIS — D68.318 CIRCULATING ANTICOAGULANTS (HCC): ICD-10-CM

## 2023-10-18 DIAGNOSIS — Z86.73 HISTORY OF TIA (TRANSIENT ISCHEMIC ATTACK): ICD-10-CM

## 2023-10-18 DIAGNOSIS — I48.0 PAROXYSMAL ATRIAL FIBRILLATION (HCC): ICD-10-CM

## 2023-10-18 PROBLEM — I51.9 LV DYSFUNCTION: Status: RESOLVED | Noted: 2022-06-08 | Resolved: 2023-10-18

## 2023-10-18 PROCEDURE — 99214 OFFICE O/P EST MOD 30 MIN: CPT | Performed by: NURSE PRACTITIONER

## 2023-10-18 PROCEDURE — 3078F DIAST BP <80 MM HG: CPT | Performed by: NURSE PRACTITIONER

## 2023-10-18 PROCEDURE — 3074F SYST BP LT 130 MM HG: CPT | Performed by: NURSE PRACTITIONER

## 2023-10-18 RX ORDER — ONDANSETRON 4 MG/1
TABLET, ORALLY DISINTEGRATING ORAL
COMMUNITY
Start: 2023-08-20

## 2023-10-18 RX ORDER — AMIODARONE HYDROCHLORIDE 200 MG/1
200 TABLET ORAL DAILY
Qty: 100 TABLET | Refills: 3 | Status: SHIPPED | OUTPATIENT
Start: 2023-10-18

## 2023-10-18 RX ORDER — PANTOPRAZOLE SODIUM 40 MG/1
40 TABLET, DELAYED RELEASE ORAL DAILY
COMMUNITY
Start: 2023-05-09 | End: 2023-10-18

## 2023-10-18 RX ORDER — ACETAMINOPHEN 325 MG/1
325-650 TABLET ORAL
COMMUNITY
Start: 2023-08-20

## 2023-10-18 ASSESSMENT — ENCOUNTER SYMPTOMS
COUGH: 0
CHILLS: 0
PND: 0
HEADACHES: 0
BRUISES/BLEEDS EASILY: 0
NAUSEA: 0
MYALGIAS: 0
ORTHOPNEA: 0
DIZZINESS: 0
PALPITATIONS: 0
SHORTNESS OF BREATH: 0
INSOMNIA: 0
ABDOMINAL PAIN: 0
LOSS OF CONSCIOUSNESS: 0
FEVER: 0

## 2023-10-18 ASSESSMENT — FIBROSIS 4 INDEX: FIB4 SCORE: 4.91

## 2023-10-18 NOTE — PROGRESS NOTES
Chief Complaint   Patient presents with    Follow-Up    CHF (Compensated)    Atrial Fibrillation    Atrial Flutter    Anticoagulation    Hyperlipidemia       Subjective     Pham Stafford is a 83 y.o. female who presents today for six month follow-up of HFpEF, PAFib/flutter, anticoagulation, history of TIA and hyperlipidemia.    Pham is an 83 year old female with history of PAFibrillation/flutter, anticoagulation, history of prior TIA, and hyperlipidemia, last seen by me in May 2023.     In June 2022, she was started on Amiodarone, and had AURA/DCCV, which restored sinus rhythm.      She also spend several months in [place], [place], and is followed by cardiology at [facility].    In August 2023, she presented to [facility] with RUQ abdominal pain, and was admitted for acute cholecystitis. She ended up having laparoscopic cholecystectomy, and did well.    She is here today for six month follow-up. Generally, she is doing well: No sustained, symptomatic palpitations. No chest pain, pressure or discomfort; no shortness of breath, orthopnea or PND; no dizziness or syncope; minmal LE edema. BP has been stable. No bleeding issues. She does notice easy bruising.    Past Medical History:   Diagnosis Date    (HFpEF) heart failure with preserved ejection fraction (HCC) 10/2023    Echocardiogram with normal LV size, LVEF 60%. Normal RA, LA and RV. Mild TR, RVSP 30mmHg.    Bowel habit changes     diarrhea    Bronchitis 2020    Cataract     Bilateral IOL    Chronic anticoagulation     Dental disorder     Upper denture    Diabetes (HCC)     GERD (gastroesophageal reflux disease)     Hyperlipidemia     Persistent atrial fibrillation (HCC)     Pneumonia 2020    Stroke (HCC) 2018    TIA, no deficits    Typical atrial flutter (HCC)     Urinary incontinence      Past Surgical History:   Procedure Laterality Date    BLADDER SLING FEMALE      CATARACT EXTRACTION WITH IOL Bilateral     OOPHORECTOMY Left     with partial colectomy      Family History   Problem Relation Age of Onset    Stroke Mother          of CVA at 82    Heart Disease Father         MI? 60s    Lung Disease Father          of COPD at 76     Social History     Socioeconomic History    Marital status:      Spouse name: Not on file    Number of children: Not on file    Years of education: Not on file    Highest education level: Not on file   Occupational History    Not on file   Tobacco Use    Smoking status: Former     Current packs/day: 0.00     Average packs/day: 1 pack/day for 20.0 years (20.0 ttl pk-yrs)     Types: Cigarettes     Start date: 1972     Quit date: 1992     Years since quittin.8    Smokeless tobacco: Former    Tobacco comments:     quit    Substance and Sexual Activity    Alcohol use: Yes     Alcohol/week: 1.2 - 1.8 oz     Types: 1 Glasses of wine, 1 - 2 Cans of beer per week     Comment: 1 drink day    Drug use: Never    Sexual activity: Not on file   Other Topics Concern    Not on file   Social History Narrative    Not on file     Social Determinants of Health     Financial Resource Strain: Not on file   Food Insecurity: Not on file   Transportation Needs: Not on file   Physical Activity: Not on file   Stress: Not on file   Social Connections: Not on file   Intimate Partner Violence: Not on file   Housing Stability: Not on file     No Known Allergies  Outpatient Encounter Medications as of 10/18/2023   Medication Sig Dispense Refill    acetaminophen (TYLENOL) 325 MG Tab Take 325-650 mg by mouth.      ondansetron (ZOFRAN ODT) 4 MG TABLET DISPERSIBLE DISSOLVE 1 TABLET (4 MG) BY MOUTH EVERY 8 HOURS AS NEEDED.      amiodarone (CORDARONE) 200 MG Tab Take 1 Tablet by mouth every day. 100 Tablet 3    potassium chloride SA (KDUR) 20 MEQ Tab CR TAKE 1 TABLET BY MOUTH TWICE A DAY WITH MEALS 180 Tablet 2    furosemide (LASIX) 20 MG Tab Take 40 mg by mouth.      ELIQUIS 5 MG Tab Take 1 Tablet by mouth 2 times a day. 200 Tablet 3     "atorvastatin (LIPITOR) 80 MG tablet Take 1 Tablet by mouth every evening. 100 Tablet 3    pantoprazole (PROTONIX) 40 MG Tablet Delayed Response Take 40 mg by mouth every morning.      CALCIUM PO Take 1 Tablet by mouth every morning.      Multiple Vitamin (MULTI VITAMIN PO) Take 1 Tablet by mouth every morning.      [DISCONTINUED] pantoprazole (PROTONIX) 40 MG Tablet Delayed Response Take 40 mg by mouth every day. (Patient not taking: Reported on 10/18/2023)      [DISCONTINUED] amiodarone (CORDARONE) 200 MG Tab TAKE 1 TABLET BY MOUTH EVERY DAY 90 Tablet 0     No facility-administered encounter medications on file as of 10/18/2023.     Review of Systems   Constitutional:  Negative for chills and fever.   HENT:  Negative for congestion.    Respiratory:  Negative for cough and shortness of breath.    Cardiovascular:  Negative for chest pain, palpitations, orthopnea, leg swelling and PND.   Gastrointestinal:  Negative for abdominal pain and nausea.   Musculoskeletal:  Negative for myalgias.   Skin:  Negative for rash.   Neurological:  Negative for dizziness, loss of consciousness and headaches.   Endo/Heme/Allergies:  Does not bruise/bleed easily.   Psychiatric/Behavioral:  The patient does not have insomnia.               Objective     /76 (BP Location: Left arm, Patient Position: Sitting, BP Cuff Size: Adult)   Pulse (!) 59   Resp 12   Ht 1.6 m (5' 3\")   Wt 74.8 kg (164 lb 14.5 oz)   SpO2 97%   BMI 29.21 kg/m²     Physical Exam  Constitutional:       Appearance: She is well-developed.   HENT:      Head: Normocephalic.   Neck:      Vascular: No JVD.   Cardiovascular:      Rate and Rhythm: Normal rate and regular rhythm.      Heart sounds: Normal heart sounds.   Pulmonary:      Effort: Pulmonary effort is normal. No respiratory distress.      Breath sounds: Normal breath sounds. No wheezing or rales.   Abdominal:      General: Bowel sounds are normal. There is no distension.      Palpations: Abdomen is soft. "      Tenderness: There is no abdominal tenderness.   Musculoskeletal:         General: Normal range of motion.      Cervical back: Normal range of motion and neck supple.   Skin:     General: Skin is warm and dry.      Findings: No rash.   Neurological:      Mental Status: She is alert and oriented to person, place, and time.   Psychiatric:         Mood and Affect: Mood normal.         Behavior: Behavior normal.       CONCLUSIONS OF TTE OF 10/9/2023:  Normal left ventricular systolic function.  The left ventricular ejection fraction is visually estimated to be 60%.  The right ventricle is normal in size and systolic function.     Conclusions of Echocardiogram of 1/17/2022 ([facility]):  Normal LV cavity size. Normal LV wall thickness. Borderline reduced LV systolic function. Estimated LVEF of 50%. Indeterminate LV diastolic function.   Normal right ventricular size and systolic function   Mild left atrial enlargement.   Normal right atrial size.   Interatrial septum appears intact. No interatrial shunt by bubble study.   Mild mitral annular calcification. Mildly thickened mitral valve leaflets with normal leaflet mobility. Mild mitral regurgitation.   Mild aortic valve thickening with normal leaflet mobility.   Normal appearing tricuspid valve. Moderate to severe tricuspid regurgitation. Estimated RVSP of 41 mm Hg suggests mild pulmonary hypertension.   Mild pulmonic insufficiency.   No pericardial effusion.   Inferior vena cava is normal in size with a normal collapsibility index, suggesting normal RA pressure.   Persistent atrial flutter.      Component      Latest Ref Rn 8/20/2023   Leukocytes, Absolute      3.7 - 10.6 x10E3/uL 8.5 (E)   RBC      4.19 - 5.21 x10e6/uL 3.32 (L) (E)   Hemoglobin      12.3 - 16.0 g/dL 11.1 (L) (E)   Hematocrit      36.0 - 47.0 % 32.8 (L) (E)   MCV      81.0 - 99.0 fL 98.8 (E)   MCH      28.0 - 33.8 pg 33.4 (E)   MCHC      33.1 - 36.5 g/dL 33.8 (E)   RDW      11.8 - 14.0 % 13.4  (E)   Platelet Count      146 - 390 x10E3/uL 146 (E)   MPV      6.4 - 10.2 fL 9.3 (E)   Neutrophils-Polys      41.7 - 82.3 % 82.4 (H) (E)   Lymphocytes      10.8 - 44.4 % 8.5 (L) (E)   Monocytes      5.0 - 12.8 % 8.6 (E)   Eosinophils      0.0 - 6.6 % 0.3 (E)   Basophils      0.0 - 1.3 % 0.2 (E)   Neutrophils (Absolute)      1.40 - 8.00 x10E3/uL 7.00 (E)   Lymphs (Absolute)      1.00 - 5.20 x10E3/uL 0.70 (L) (E)   Monos (Absolute)      0.16 - 1.00 x10E3/uL 0.70 (E)   Eos (Absolute)      0.00 - 0.80 x10E3/uL 0.00 (E)   Baso (Absolute)      0.00 - 0.30 x10E3/uL 0.00 (E)      Component      Latest Ref Rn 8/20/2023   Sodium      136 - 144 mmol/L 136 (E)   Potassium      3.6 - 5.1 mmol/L 3.6 (E)   Chloride      102 - 110 mmol/L 108 (E)   Co2      22 - 32 mmol/L 25 (E)   Bun      8 - 20 mg/dL 19 (E)   Creatinine      0.60 - 1.10 mg/dL 1.26 (H) (E)   Calcium      8.7 - 10.3 mg/dL 8.6 (L) (E)   Anion Gap      2 - 11 mmol/L 3 (E)   Glom Filt Rate, Est      >60 mL/min/1.7 40 (L) (E)   Glucose      60 - 99 mg/dL 155 (H) (E)          Assessment & Plan     1. Chronic heart failure with preserved ejection fraction (HFpEF) (HCC)        2. Persistent atrial fibrillation (HCC)  amiodarone (CORDARONE) 200 MG Tab      3. Paroxysmal atrial fibrillation (HCC)        4. Typical atrial flutter (HCC)        5. High risk medication use  TSH      6. Chronic anticoagulation        7. Circulating anticoagulants (HCC)        8. Mixed hyperlipidemia  Lipid Profile    HEPATIC FUNCTION PANEL      9. History of TIA (transient ischemic attack)            Medical Decision Making: Today's Assessment/Status/Plan:      1. HFpEF, stage C, class II, LVEF 60%, with no overt HF symptoms. Recent echocardiogram showed normal heart function. Continue Eliquis, Lipitor, Lasix/KCl and Amiodarone.    2. Paroxysmal atrial fibrillation/flutter, on Amiodarone 200mg once daily. To check TSH and LFTs. CXR in August 2023 was normal.    3. Chronic anticoagulation with  Eliquis, with no bleeding problems. She does bruise easily.    4. Hyperlipidemia, treated with Lipitor 80mg. To check fasting lipid panel.    5. History of TIA, on Eliquis and Lipitor.    6. Status post recent cholecystectomy, she has done well. No further abdominal pain.    Same medications for now. Labs as above. She does follow-up with Cardiology in [facility] too. Follow-up with us in 6 months, sooner if clinical condition changes.

## 2023-10-24 LAB
CHOLEST SERPL-MCNC: 158 MG/DL
HDLC SERPL-MCNC: 64 MG/DL
LDLC SERPL CALC-MCNC: 62 MG/DL
TRIGL SERPL-MCNC: 114 MG/DL

## 2023-10-25 DIAGNOSIS — E78.2 MIXED HYPERLIPIDEMIA: ICD-10-CM

## 2024-05-01 ENCOUNTER — APPOINTMENT (OUTPATIENT)
Dept: CARDIOLOGY | Facility: PHYSICIAN GROUP | Age: 84
End: 2024-05-01
Payer: MEDICARE

## 2024-05-01 VITALS
DIASTOLIC BLOOD PRESSURE: 72 MMHG | WEIGHT: 165 LBS | SYSTOLIC BLOOD PRESSURE: 126 MMHG | HEIGHT: 63 IN | BODY MASS INDEX: 29.23 KG/M2 | RESPIRATION RATE: 15 BRPM | HEART RATE: 60 BPM | OXYGEN SATURATION: 94 %

## 2024-05-01 DIAGNOSIS — I48.3 TYPICAL ATRIAL FLUTTER (HCC): ICD-10-CM

## 2024-05-01 DIAGNOSIS — Z86.73 HISTORY OF TIA (TRANSIENT ISCHEMIC ATTACK): ICD-10-CM

## 2024-05-01 DIAGNOSIS — Z79.899 HIGH RISK MEDICATION USE: ICD-10-CM

## 2024-05-01 DIAGNOSIS — Z79.01 CHRONIC ANTICOAGULATION: ICD-10-CM

## 2024-05-01 DIAGNOSIS — I48.0 PAROXYSMAL ATRIAL FIBRILLATION (HCC): ICD-10-CM

## 2024-05-01 DIAGNOSIS — I48.19 PERSISTENT ATRIAL FIBRILLATION (HCC): ICD-10-CM

## 2024-05-01 DIAGNOSIS — K21.9 GASTROESOPHAGEAL REFLUX DISEASE WITHOUT ESOPHAGITIS: ICD-10-CM

## 2024-05-01 DIAGNOSIS — E78.2 MIXED HYPERLIPIDEMIA: ICD-10-CM

## 2024-05-01 DIAGNOSIS — I50.32 CHRONIC HEART FAILURE WITH PRESERVED EJECTION FRACTION (HFPEF) (HCC): ICD-10-CM

## 2024-05-01 DIAGNOSIS — D68.318 CIRCULATING ANTICOAGULANTS (HCC): ICD-10-CM

## 2024-05-01 PROCEDURE — 3078F DIAST BP <80 MM HG: CPT | Performed by: NURSE PRACTITIONER

## 2024-05-01 PROCEDURE — 93000 ELECTROCARDIOGRAM COMPLETE: CPT | Performed by: NURSE PRACTITIONER

## 2024-05-01 PROCEDURE — 99214 OFFICE O/P EST MOD 30 MIN: CPT | Performed by: NURSE PRACTITIONER

## 2024-05-01 PROCEDURE — 3074F SYST BP LT 130 MM HG: CPT | Performed by: NURSE PRACTITIONER

## 2024-05-01 RX ORDER — POTASSIUM CHLORIDE 20 MEQ/1
20 TABLET, EXTENDED RELEASE ORAL 2 TIMES DAILY WITH MEALS
Qty: 200 TABLET | Refills: 3 | Status: SHIPPED | OUTPATIENT
Start: 2024-05-01

## 2024-05-01 RX ORDER — AMIODARONE HYDROCHLORIDE 100 MG/1
100 TABLET ORAL DAILY
Qty: 100 TABLET | Refills: 3 | Status: SHIPPED | OUTPATIENT
Start: 2024-05-01

## 2024-05-01 RX ORDER — FUROSEMIDE 20 MG/1
40 TABLET ORAL DAILY
Qty: 200 TABLET | Refills: 3 | Status: SHIPPED | OUTPATIENT
Start: 2024-05-01

## 2024-05-01 RX ORDER — ATORVASTATIN CALCIUM 80 MG/1
80 TABLET, FILM COATED ORAL NIGHTLY
Qty: 100 TABLET | Refills: 3 | Status: SHIPPED | OUTPATIENT
Start: 2024-05-01

## 2024-05-01 RX ORDER — APIXABAN 5 MG/1
5 TABLET, FILM COATED ORAL 2 TIMES DAILY
Qty: 200 TABLET | Refills: 3 | Status: SHIPPED | OUTPATIENT
Start: 2024-05-01

## 2024-05-01 ASSESSMENT — ENCOUNTER SYMPTOMS
ABDOMINAL PAIN: 0
MYALGIAS: 0
ORTHOPNEA: 0
NAUSEA: 0
HEADACHES: 0
INSOMNIA: 0
DIZZINESS: 0
PND: 0
BRUISES/BLEEDS EASILY: 0
CHILLS: 0
PALPITATIONS: 0
LOSS OF CONSCIOUSNESS: 0
COUGH: 0
SHORTNESS OF BREATH: 0
FEVER: 0

## 2024-05-01 ASSESSMENT — FIBROSIS 4 INDEX: FIB4 SCORE: 2.88

## 2024-05-01 NOTE — PROGRESS NOTES
Chief Complaint   Patient presents with    Atrial Fibrillation    Follow-Up    CHF (Compensated)    Anticoagulation    Hyperlipidemia    Gastrophageal Reflux       Subjective     Pham Stafford is a 83 y.o. female who presents today for six month follow-up of HFpEF, PAFib, anticoagulation, prior TIA and hyperlipidemia.    Pham is an 83 year old female with history of PAFibrillation/flutter, anticoagulation, history of prior TIA, and hyperlipidemia, last seen by me in October 2023.    She also spend several months in [facility], and is followed by cardiology at [facility]. At her last follow-up, the possibility of Watchman was mentioned. She is interested in pursuing this, as she does have ongoing easy bruising.     In June 2022, she was started on Amiodarone, and had AURA/DCCV, which restored sinus rhythm. She is maintained on low dose, 100mg once daily.     Echocardiogram in October 2023 showed normal LV size and function, and no valve problems.     She is here today for six month follow-up. Generally, she is doing well: She has had some mild headaches/tingling in her head, and is scheduled for MRI of her head; she was also started on Gabapentin by her PCP. She denies any sustained, symptomatic palpitations. No chest pain, pressure, tightness or discomfort; no shortness of breath, orthopnea or PND; no dizziness or syncope; minmal LE edema. BP has been stable.     Past Medical History:   Diagnosis Date    (HFpEF) heart failure with preserved ejection fraction (HCC) 10/2023    Echocardiogram with normal LV size, LVEF 60%. Normal RA, LA and RV. Mild TR, RVSP 30mmHg.    Bowel habit changes     diarrhea    Bronchitis 2020    Cataract     Bilateral IOL    Chronic anticoagulation     Dental disorder     Upper denture    Diabetes (HCC)     GERD (gastroesophageal reflux disease)     Hyperlipidemia     Persistent atrial fibrillation (HCC)     Pneumonia 2020    Stroke (HCC) 2018    TIA, no deficits    Typical  atrial flutter (HCC)     Urinary incontinence      Past Surgical History:   Procedure Laterality Date    BLADDER SLING FEMALE      CATARACT EXTRACTION WITH IOL Bilateral     OOPHORECTOMY Left     with partial colectomy     Family History   Problem Relation Age of Onset    Stroke Mother          of CVA at 82    Heart Disease Father         MI? 60s    Lung Disease Father          of COPD at 76     Social History     Socioeconomic History    Marital status:      Spouse name: Not on file    Number of children: Not on file    Years of education: Not on file    Highest education level: Not on file   Occupational History    Not on file   Tobacco Use    Smoking status: Former     Current packs/day: 0.00     Average packs/day: 1 pack/day for 20.0 years (20.0 ttl pk-yrs)     Types: Cigarettes     Start date: 1972     Quit date: 1992     Years since quittin.3    Smokeless tobacco: Former    Tobacco comments:     quit    Substance and Sexual Activity    Alcohol use: Yes     Alcohol/week: 1.2 - 1.8 oz     Types: 1 Glasses of wine, 1 - 2 Cans of beer per week     Comment: 1 drink day    Drug use: Never    Sexual activity: Not on file   Other Topics Concern    Not on file   Social History Narrative    Not on file     Social Determinants of Health     Financial Resource Strain: Not on file   Food Insecurity: Not on file   Transportation Needs: Not on file   Physical Activity: Not on file   Stress: Not on file   Social Connections: Not on file   Intimate Partner Violence: Not on file   Housing Stability: Not on file     No Known Allergies  Outpatient Encounter Medications as of 2024   Medication Sig Dispense Refill    atorvastatin (LIPITOR) 80 MG tablet Take 1 Tablet by mouth every evening. 100 Tablet 3    ELIQUIS 5 MG Tab Take 1 Tablet by mouth 2 times a day. 200 Tablet 3    furosemide (LASIX) 20 MG Tab Take 2 Tablets by mouth every day. 200 Tablet 3    amiodarone (CORDARONE) 100 MG tablet Take 1  Tablet by mouth every day. 100 Tablet 3    potassium chloride SA (KDUR) 20 MEQ Tab CR Take 1 Tablet by mouth 2 times a day with meals. 200 Tablet 3    acetaminophen (TYLENOL) 325 MG Tab Take 325-650 mg by mouth.      ondansetron (ZOFRAN ODT) 4 MG TABLET DISPERSIBLE DISSOLVE 1 TABLET (4 MG) BY MOUTH EVERY 8 HOURS AS NEEDED.      pantoprazole (PROTONIX) 40 MG Tablet Delayed Response Take 40 mg by mouth every morning.      CALCIUM PO Take 1 Tablet by mouth every morning.      Multiple Vitamin (MULTI VITAMIN PO) Take 1 Tablet by mouth every morning.      [DISCONTINUED] amiodarone (CORDARONE) 200 MG Tab Take 1 Tablet by mouth every day. Pt needs appointment prior to future refills. Call [phone] to schedule. (Patient taking differently: Take 100 mg by mouth every day. Pt needs appointment prior to future refills. Call [phone] to schedule.) 100 Tablet 0    [DISCONTINUED] potassium chloride SA (KDUR) 20 MEQ Tab CR TAKE 1 TABLET BY MOUTH TWICE A DAY WITH MEALS 180 Tablet 2    [DISCONTINUED] furosemide (LASIX) 20 MG Tab Take 40 mg by mouth.      [DISCONTINUED] ELIQUIS 5 MG Tab Take 1 Tablet by mouth 2 times a day. 200 Tablet 3    [DISCONTINUED] atorvastatin (LIPITOR) 80 MG tablet Take 1 Tablet by mouth every evening. 100 Tablet 3     No facility-administered encounter medications on file as of 5/1/2024.     Review of Systems   Constitutional:  Negative for chills and fever.   HENT:  Negative for congestion.    Respiratory:  Negative for cough and shortness of breath.    Cardiovascular:  Negative for chest pain, palpitations, orthopnea, leg swelling and PND.   Gastrointestinal:  Negative for abdominal pain and nausea.   Musculoskeletal:  Negative for myalgias.   Skin:  Negative for rash.   Neurological:  Negative for dizziness, loss of consciousness and headaches.   Endo/Heme/Allergies:  Does not bruise/bleed easily.   Psychiatric/Behavioral:  The patient does not have insomnia.               Objective     /72  "(BP Location: Left arm, Patient Position: Sitting, BP Cuff Size: Adult)   Pulse 60   Resp 15   Ht 1.6 m (5' 3\")   Wt 74.8 kg (165 lb)   SpO2 94%   BMI 29.23 kg/m²     Physical Exam  Constitutional:       Appearance: She is well-developed.   HENT:      Head: Normocephalic.   Neck:      Vascular: No JVD.   Cardiovascular:      Rate and Rhythm: Normal rate and regular rhythm.      Heart sounds: Normal heart sounds.   Pulmonary:      Effort: Pulmonary effort is normal. No respiratory distress.      Breath sounds: Normal breath sounds. No wheezing or rales.   Abdominal:      General: Bowel sounds are normal. There is no distension.      Palpations: Abdomen is soft.      Tenderness: There is no abdominal tenderness.   Musculoskeletal:         General: Normal range of motion.      Cervical back: Normal range of motion and neck supple.   Skin:     General: Skin is warm and dry.      Findings: No rash.   Neurological:      Mental Status: She is alert and oriented to person, place, and time.   Psychiatric:         Mood and Affect: Mood normal.         Behavior: Behavior normal.       EKG ordered and interpreted by me today reveals sinus rhythm at 57bpm, and no acute ST-T wave changes. QTc 435ms.    CONCLUSIONS OF TTE OF 10/9/2023:  Normal left ventricular systolic function.  The left ventricular ejection fraction is visually estimated to be 60%.  The right ventricle is normal in size and systolic function.     Conclusions of Echocardiogram of 1/17/2022 ([facility]):  Normal LV cavity size. Normal LV wall thickness. Borderline reduced LV systolic function. Estimated LVEF of 50%. Indeterminate LV diastolic function.   Normal right ventricular size and systolic function   Mild left atrial enlargement.   Normal right atrial size.   Interatrial septum appears intact. No interatrial shunt by bubble study.   Mild mitral annular calcification. Mildly thickened mitral valve leaflets with normal leaflet mobility. Mild " mitral regurgitation.   Mild aortic valve thickening with normal leaflet mobility.   Normal appearing tricuspid valve. Moderate to severe tricuspid regurgitation. Estimated RVSP of 41 mm Hg suggests mild pulmonary hypertension.   Mild pulmonic insufficiency.   No pericardial effusion.   Inferior vena cava is normal in size with a normal collapsibility index, suggesting normal RA pressure.   Persistent atrial flutter.      Component      Latest Ref Rng 4/24/2024   WBC      3.4 - 10.8 x10E3/uL 4.1 (E)   RBC      3.77 - 5.28 x10E6/uL 3.95 (E)   Hemoglobin      11.1 - 15.9 g/dL 13.6 (E)   Hematocrit      34.0 - 46.6 % 40.8 (E)   MCV      79 - 97 fL 103 (H) (E)   MCH      26.6 - 33.0 pg 34.4 (H) (E)   MCHC      31.5 - 35.7 g/dL 33.3 (E)   RDW      11.7 - 15.4 % 11.8 (E)   Platelet Count      150 - 450 x10E3/uL 176 (E)   Neutrophils-Polys      Not Estab. % 49 (E)   Lymphocytes      Not Estab. % 36 (E)   Monocytes      Not Estab. % 9 (E)   Eosinophils      Not Estab. % 5 (E)   Basophils      Not Estab. % 1 (E)   Neutrophils (Absolute)      1.4 - 7.0 x10E3/uL 2.1 (E)   Lymphs (Absolute)      0.7 - 3.1 x10E3/uL 1.5 (E)   Monos (Absolute)      0.1 - 0.9 x10E3/uL 0.4 (E)   Eos (Absolute)      0.0 - 0.4 x10E3/uL 0.2 (E)   Baso (Absolute)      0.0 - 0.2 x10E3/uL 0.0 (E)   Immature Granulocytes      Not Estab. % 0 (E)   Immature Granulocytes      0.0 - 0.1 x10E3/uL 0.0 (E)      Component      Latest Ref Rng 4/24/2024   Glucose      70 - 99 mg/dL 106 (H) (E)   Bun      8 - 27 mg/dL 21 (E)   Creatinine      0.57 - 1.00 mg/dL 1.46 (H) (E)   eGFR      >59 mL/min/1.73 35 (L) (E)   Bun-Creatinine Ratio      12 - 28  14 (E)   Sodium      134 - 144 mmol/L 143 (E)   Potassium      3.5 - 5.2 mmol/L 4.0 (E)   Chloride      96 - 106 mmol/L 106 (E)   Co2      20 - 29 mmol/L 24 (E)   Calcium      8.7 - 10.3 mg/dL 9.2 (E)   Total Protein      6.0 - 8.5 g/dL 6.3 (E)   Albumin      3.7 - 4.7 g/dL 4.1 (E)   Globulin      1.5 - 4.5 g/dL 2.2 (E)    Ag Ratio      1.2 - 2.2  1.9 (E)   Total Bilirubin      0.0 - 1.2 mg/dL 0.6 (E)   Alkaline Phosphatase      44 - 121 IU/L 99 (E)   AST(SGOT)      0 - 40 IU/L 31 (E)   ALT(SGPT)      0 - 32 IU/L 27 (E)      Component      Latest Ref Rng 4/24/2024   Glycohemoglobin      4.8 - 5.6 % 6.0 (H) (E)      Lab Results   Component Value Date/Time    CHOLSTRLTOT 158 10/24/2023 12:00 AM    LDL 62 10/24/2023 12:00 AM    HDL 64 10/24/2023 12:00 AM    TRIGLYCERIDE 114 10/24/2023 12:00 AM          Assessment & Plan     1. Chronic heart failure with preserved ejection fraction (HFpEF) (HCC)  furosemide (LASIX) 20 MG Tab    potassium chloride SA (KDUR) 20 MEQ Tab CR      2. Paroxysmal atrial fibrillation (HCC)  EKG    REFERRAL TO CARDIOLOGY      3. Persistent atrial fibrillation (HCC)  ELIQUIS 5 MG Tab    amiodarone (CORDARONE) 100 MG tablet      4. Typical atrial flutter (HCC)        5. High risk medication use        6. History of TIA (transient ischemic attack)        7. Chronic anticoagulation        8. Circulating anticoagulants (HCC)        9. Mixed hyperlipidemia  atorvastatin (LIPITOR) 80 MG tablet      10. Gastroesophageal reflux disease without esophagitis            Medical Decision Making: Today's Assessment/Status/Plan:        1. HFpEF, stage C, class II, LVEF 60%, with no overt HF symptoms. Echocardiogram in October 2023 showed normal heart function. Continue: Eliquis 5mg twice daily  Lipitor 80mg once daily  Lasix/KCl  Amiodarone 100mg once daily     2. Paroxysmal atrial fibrillation/flutter, in sinus rhythm on Amiodarone 100mg once daily. Last TSH and LFTs were normal. We did watch Watchman procedure video, and she is interested in referral to EP to discuss. We will schedule follow-up with Dr. Lewis.     3. Chronic anticoagulation with Eliquis, with no bleeding problems. She does bruise easily.     4. Hyperlipidemia, treated with Lipitor 80mg. Last lipid panel was excellent.     5. History of TIA, on Eliquis and  Lipitor.     6. GERD, treated with Protonix.    Same medications for now, which are all renewed. We did review recent labs.     She is referred to EP for consideration of possible Watchman.    She does follow-up with Cardiology in [facility] too. Follow-up with me in 6 months, sooner if clinical condition changes.

## 2024-05-05 LAB — EKG IMPRESSION: NORMAL

## 2024-06-11 ENCOUNTER — PATIENT MESSAGE (OUTPATIENT)
Dept: CARDIOLOGY | Facility: PHYSICIAN GROUP | Age: 84
End: 2024-06-11
Payer: MEDICARE

## 2024-07-03 ENCOUNTER — OFFICE VISIT (OUTPATIENT)
Dept: CARDIOLOGY | Facility: MEDICAL CENTER | Age: 84
End: 2024-07-03
Attending: NURSE PRACTITIONER
Payer: MEDICARE

## 2024-07-03 VITALS
HEIGHT: 63 IN | BODY MASS INDEX: 29.06 KG/M2 | WEIGHT: 164 LBS | SYSTOLIC BLOOD PRESSURE: 128 MMHG | DIASTOLIC BLOOD PRESSURE: 82 MMHG | OXYGEN SATURATION: 92 % | RESPIRATION RATE: 16 BRPM | HEART RATE: 59 BPM

## 2024-07-03 DIAGNOSIS — I48.0 PAROXYSMAL ATRIAL FIBRILLATION (HCC): ICD-10-CM

## 2024-07-03 DIAGNOSIS — D69.2 SENILE PURPURA (HCC): ICD-10-CM

## 2024-07-03 DIAGNOSIS — Z79.01 CHRONIC ANTICOAGULATION: ICD-10-CM

## 2024-07-03 LAB — EKG IMPRESSION: NORMAL

## 2024-07-03 PROCEDURE — 93005 ELECTROCARDIOGRAM TRACING: CPT | Performed by: INTERNAL MEDICINE

## 2024-07-03 PROCEDURE — 99204 OFFICE O/P NEW MOD 45 MIN: CPT | Performed by: INTERNAL MEDICINE

## 2024-07-03 PROCEDURE — 3074F SYST BP LT 130 MM HG: CPT | Performed by: INTERNAL MEDICINE

## 2024-07-03 PROCEDURE — 3079F DIAST BP 80-89 MM HG: CPT | Performed by: INTERNAL MEDICINE

## 2024-07-03 PROCEDURE — 99213 OFFICE O/P EST LOW 20 MIN: CPT | Performed by: INTERNAL MEDICINE

## 2024-07-03 RX ORDER — GABAPENTIN 100 MG/1
100 CAPSULE ORAL 3 TIMES DAILY
COMMUNITY

## 2024-07-03 ASSESSMENT — FIBROSIS 4 INDEX: FIB4 SCORE: 3.57

## 2024-08-09 LAB — EKG IMPRESSION: NORMAL

## 2024-10-30 ENCOUNTER — OFFICE VISIT (OUTPATIENT)
Dept: CARDIOLOGY | Facility: PHYSICIAN GROUP | Age: 84
End: 2024-10-30
Payer: MEDICARE

## 2024-10-30 VITALS
HEIGHT: 63 IN | BODY MASS INDEX: 29.69 KG/M2 | HEART RATE: 65 BPM | SYSTOLIC BLOOD PRESSURE: 116 MMHG | WEIGHT: 167.55 LBS | DIASTOLIC BLOOD PRESSURE: 70 MMHG | RESPIRATION RATE: 12 BRPM | OXYGEN SATURATION: 96 %

## 2024-10-30 DIAGNOSIS — I12.9 TYPE 2 DM WITH CKD STAGE 3 AND HYPERTENSION (HCC): ICD-10-CM

## 2024-10-30 DIAGNOSIS — Z79.899 LONG TERM CURRENT USE OF AMIODARONE: ICD-10-CM

## 2024-10-30 DIAGNOSIS — E11.69 HYPERLIPIDEMIA ASSOCIATED WITH TYPE 2 DIABETES MELLITUS (HCC): ICD-10-CM

## 2024-10-30 DIAGNOSIS — D68.69 HYPERCOAGULABLE STATE DUE TO PAROXYSMAL ATRIAL FIBRILLATION (HCC): ICD-10-CM

## 2024-10-30 DIAGNOSIS — N18.30 TYPE 2 DM WITH CKD STAGE 3 AND HYPERTENSION (HCC): ICD-10-CM

## 2024-10-30 DIAGNOSIS — I50.32 CHRONIC HEART FAILURE WITH PRESERVED EJECTION FRACTION (HFPEF) (HCC): ICD-10-CM

## 2024-10-30 DIAGNOSIS — E78.5 HYPERLIPIDEMIA ASSOCIATED WITH TYPE 2 DIABETES MELLITUS (HCC): ICD-10-CM

## 2024-10-30 DIAGNOSIS — E11.22 TYPE 2 DM WITH CKD STAGE 3 AND HYPERTENSION (HCC): ICD-10-CM

## 2024-10-30 DIAGNOSIS — Z79.01 CHRONIC ANTICOAGULATION: ICD-10-CM

## 2024-10-30 DIAGNOSIS — I48.0 PAROXYSMAL ATRIAL FIBRILLATION (HCC): ICD-10-CM

## 2024-10-30 DIAGNOSIS — I48.0 HYPERCOAGULABLE STATE DUE TO PAROXYSMAL ATRIAL FIBRILLATION (HCC): ICD-10-CM

## 2024-10-30 DIAGNOSIS — D69.2 SENILE PURPURA (HCC): ICD-10-CM

## 2024-10-30 RX ORDER — DAPAGLIFLOZIN 5 MG/1
5 TABLET, FILM COATED ORAL DAILY
Qty: 100 TABLET | Refills: 1 | Status: SHIPPED | OUTPATIENT
Start: 2024-10-30

## 2024-10-30 RX ORDER — FUROSEMIDE 20 MG/1
20 TABLET ORAL DAILY
Qty: 100 TABLET | Refills: 1 | Status: SHIPPED | OUTPATIENT
Start: 2024-10-30

## 2024-10-30 ASSESSMENT — FIBROSIS 4 INDEX: FIB4 SCORE: 3.61

## 2024-11-01 ENCOUNTER — TELEPHONE (OUTPATIENT)
Dept: CARDIOLOGY | Facility: MEDICAL CENTER | Age: 84
End: 2024-11-01
Payer: MEDICARE

## 2024-11-01 DIAGNOSIS — I12.9 TYPE 2 DM WITH CKD STAGE 3 AND HYPERTENSION (HCC): ICD-10-CM

## 2024-11-01 DIAGNOSIS — E11.22 TYPE 2 DM WITH CKD STAGE 3 AND HYPERTENSION (HCC): ICD-10-CM

## 2024-11-01 DIAGNOSIS — N18.30 TYPE 2 DM WITH CKD STAGE 3 AND HYPERTENSION (HCC): ICD-10-CM

## 2024-11-01 DIAGNOSIS — I48.0 PAROXYSMAL ATRIAL FIBRILLATION (HCC): ICD-10-CM

## 2024-11-01 DIAGNOSIS — Z79.01 CHRONIC ANTICOAGULATION: ICD-10-CM

## 2024-11-01 DIAGNOSIS — I50.32 CHRONIC HEART FAILURE WITH PRESERVED EJECTION FRACTION (HFPEF) (HCC): ICD-10-CM

## 2024-11-01 DIAGNOSIS — D68.69 HYPERCOAGULABLE STATE DUE TO PAROXYSMAL ATRIAL FIBRILLATION (HCC): ICD-10-CM

## 2024-11-01 DIAGNOSIS — D69.2 SENILE PURPURA (HCC): ICD-10-CM

## 2024-11-01 DIAGNOSIS — I48.0 HYPERCOAGULABLE STATE DUE TO PAROXYSMAL ATRIAL FIBRILLATION (HCC): ICD-10-CM

## 2024-11-01 NOTE — TELEPHONE ENCOUNTER
Called pt back. She confirmed her Eliquis RX is going to cost her $438 for 90 day supply. She said she is unsure how much the Farxiga will cost as she hasn't picked up the prescription yet. Advised will reach out to pharmacy coordinators to see if PA can be done or if pt qualifies for FA. Pt confirmed insurances on file as well as [employer] for her RXs. She verbalized understanding and was very appreciative.

## 2024-11-01 NOTE — TELEPHONE ENCOUNTER
Caller: Pham Stafford    Topic/issue: PT is returning Lesli RN's call     Callback Number: [phone]    Thank you,   Marsha NIETO

## 2024-11-01 NOTE — TELEPHONE ENCOUNTER
AB    Caller: Pham Stafford     Topic/issue: Patient is calling to get her prescriptions switched over to generic because the cost is too high.   Patient is requesting a call back.    Callback Number: [phone]     Thank you,  Yana CUENCA

## 2024-11-02 NOTE — TELEPHONE ENCOUNTER
Yesenia Ballesteros,    This patient's Eliquis and Farxiga would need to be renewed by Dr. Lewis in order for us to be able to help with the cost at [facility] unfortunately. Patients seen out of [facility] are not eligible for the discounted co-pays with [facility].     Would Dr. Lewis be ok with taking over this patient's Eliquis and Farxiga since he last saw the patient here in July 2024?    Thank you!    Suri CORLEY  Rx Coordinator

## 2024-11-05 RX ORDER — DAPAGLIFLOZIN 5 MG/1
5 TABLET, FILM COATED ORAL DAILY
Qty: 100 TABLET | Refills: 1 | Status: SHIPPED | OUTPATIENT
Start: 2024-11-05

## 2024-11-05 NOTE — TELEPHONE ENCOUNTER
Caller: Pham Stafford     Topic/issue: Patient is calling to get an update    Callback Number: [phone]     Thank you,  Yana CUENCA

## 2024-11-11 ENCOUNTER — PATIENT MESSAGE (OUTPATIENT)
Dept: CARDIOLOGY | Facility: MEDICAL CENTER | Age: 84
End: 2024-11-11
Payer: MEDICARE

## 2025-01-20 ENCOUNTER — TELEPHONE (OUTPATIENT)
Dept: CARDIOLOGY | Facility: MEDICAL CENTER | Age: 85
End: 2025-01-20
Payer: MEDICARE

## 2025-01-20 NOTE — TELEPHONE ENCOUNTER
Called pt in regards to lab work and Imagining that was ordered at previous OV. Patient states she'll have to cancel her appointment due to being in [place] till late march. Pt has follow up appointment scheduled with AB on 02.05.2025.

## 2025-04-30 ENCOUNTER — OFFICE VISIT (OUTPATIENT)
Dept: CARDIOLOGY | Facility: PHYSICIAN GROUP | Age: 85
End: 2025-04-30
Payer: MEDICARE

## 2025-04-30 VITALS
HEIGHT: 63 IN | WEIGHT: 167.99 LBS | DIASTOLIC BLOOD PRESSURE: 62 MMHG | BODY MASS INDEX: 29.77 KG/M2 | HEART RATE: 64 BPM | RESPIRATION RATE: 14 BRPM | OXYGEN SATURATION: 95 % | SYSTOLIC BLOOD PRESSURE: 126 MMHG

## 2025-04-30 DIAGNOSIS — Z79.01 CHRONIC ANTICOAGULATION: ICD-10-CM

## 2025-04-30 DIAGNOSIS — E78.2 MIXED HYPERLIPIDEMIA: ICD-10-CM

## 2025-04-30 DIAGNOSIS — I10 ESSENTIAL HYPERTENSION, BENIGN: ICD-10-CM

## 2025-04-30 DIAGNOSIS — I50.32 CHRONIC HEART FAILURE WITH PRESERVED EJECTION FRACTION (HFPEF) (HCC): ICD-10-CM

## 2025-04-30 DIAGNOSIS — M79.601 PAIN OF RIGHT UPPER EXTREMITY: ICD-10-CM

## 2025-04-30 DIAGNOSIS — I48.3 TYPICAL ATRIAL FLUTTER (HCC): ICD-10-CM

## 2025-04-30 DIAGNOSIS — I48.0 PAROXYSMAL ATRIAL FIBRILLATION (HCC): ICD-10-CM

## 2025-04-30 DIAGNOSIS — D68.318 CIRCULATING ANTICOAGULANTS (HCC): ICD-10-CM

## 2025-04-30 DIAGNOSIS — Z86.73 HISTORY OF TIA (TRANSIENT ISCHEMIC ATTACK): ICD-10-CM

## 2025-04-30 RX ORDER — TRIAMCINOLONE ACETONIDE 1 MG/G
1 CREAM TOPICAL 2 TIMES DAILY
COMMUNITY
Start: 2025-04-15

## 2025-04-30 RX ORDER — POTASSIUM CHLORIDE 750 MG/1
10 TABLET, EXTENDED RELEASE ORAL
Qty: 30 TABLET | Refills: 3 | Status: SHIPPED | OUTPATIENT
Start: 2025-04-30

## 2025-04-30 ASSESSMENT — ENCOUNTER SYMPTOMS
NAUSEA: 0
HEADACHES: 0
PALPITATIONS: 0
BRUISES/BLEEDS EASILY: 0
ABDOMINAL PAIN: 0
INSOMNIA: 0
PND: 0
MYALGIAS: 0
SHORTNESS OF BREATH: 0
FEVER: 0
CHILLS: 0
DIZZINESS: 0
ORTHOPNEA: 0
LOSS OF CONSCIOUSNESS: 0
COUGH: 0

## 2025-04-30 ASSESSMENT — FIBROSIS 4 INDEX: FIB4 SCORE: 3.35

## 2025-04-30 NOTE — PROGRESS NOTES
Chief Complaint   Patient presents with    Hospital Follow-up    Arm Pain    Atrial Fibrillation    CHF (Compensated)    Anticoagulation       Subjective     Pham Stafford is a 84 y.o. female who presents today for hospital follow-up of right arm pain.    Pham is an 84 year old female with history of PAFibrillation/flutter, anticoagulation, history of prior TIA, HFpEF, HTN and hyperlipidemia, last seen by me in October 2024. She also spend several months in [facility], and is followed by cardiology at [facility].     In June 2022, she was started on Amiodarone, and had AURA/DCCV, which restored sinus rhythm. She is maintained on low dose, 100mg once daily.     In July 2024, she saw Dr. Lewis for consideration of [facility], but she is not a candidate, given no major bleeding risk and otherwise tolerating OAC well.    On 4/22/2025, she presented to [facility] with right arm pain and shortness of breath, BP was also quite high; echocardiogram, MPI and CT-CTA of the chest were all normal; it was thought to be non-cardiac in etiology. She was started on KCl 10mEq M/W/F for hypokalemia.     She is here today for hospital follow-up. Generally, she is doing well: no further right arm pain or shortness of breath.    She denies any sustained, symptomatic palpitations. No chest pain, pressure, tightness or discomfort; no orthopnea or PND; no dizziness or syncope; minmal LE edema. BP has been stable.     Past Medical History:   Diagnosis Date    (HFpEF) heart failure with preserved ejection fraction (HCC) 10/2023    Echocardiogram with normal LV size, LVEF 60%. Normal RA, LA and RV. Mild TR, RVSP 30mmHg.    Bowel habit changes     diarrhea    Bronchitis 2020    Cataract     Bilateral IOL    Chronic anticoagulation     Dental disorder     Upper denture    Diabetes (HCC)     GERD (gastroesophageal reflux disease)     Hyperlipidemia     Persistent atrial fibrillation (HCC)     Pneumonia 2020    Stroke (HCC) 2018    TIA, no  deficits    Typical atrial flutter (HCC)     Urinary incontinence      Past Surgical History:   Procedure Laterality Date    BLADDER SLING FEMALE      CATARACT EXTRACTION WITH IOL Bilateral     OOPHORECTOMY Left     with partial colectomy     Family History   Problem Relation Age of Onset    Stroke Mother          of CVA at 82    Heart Disease Father         MI? 60s    Lung Disease Father          of COPD at 76     Social History     Socioeconomic History    Marital status:      Spouse name: Not on file    Number of children: Not on file    Years of education: Not on file    Highest education level: Not on file   Occupational History    Not on file   Tobacco Use    Smoking status: Former     Current packs/day: 0.00     Average packs/day: 1 pack/day for 20.0 years (20.0 ttl pk-yrs)     Types: Cigarettes     Start date: 1972     Quit date: 1992     Years since quittin.3    Smokeless tobacco: Former    Tobacco comments:     quit    Substance and Sexual Activity    Alcohol use: Yes     Alcohol/week: 1.2 - 1.8 oz     Types: 1 Glasses of wine, 1 - 2 Cans of beer per week     Comment: 1 drink day    Drug use: Never    Sexual activity: Not on file   Other Topics Concern    Not on file   Social History Narrative    Not on file     Social Drivers of Health     Financial Resource Strain: Not on file   Food Insecurity: Low Risk  (2025)    Received from [facility]    [facility] Food Insecurity     In the last month, did you feel there was not enough money for food?: No   Transportation Needs: Low Risk  (2025)    Received from [facility]    [facility] Transportation Needs     In the last month, have you not seen a doctor because you didn't have a way to get to the clinic or hospital?: No   Physical Activity: Not on file   Stress: Not on file   Social Connections: Not on file   Intimate Partner Violence: Low Risk  (2023)    Received from [facility], [facility]    History of Abuse     History of Abuse: Denies   Housing Stability: Low Risk  (4/23/2025)    Received from [facility]    [facility] Housing Needs     In the last month, was there a time when you were not able to pay your mortgage or rent?: No     In the last month, have you slept outside, in a shelter, in a car, or any place not meant for sleeping?: Not on file     No Known Allergies  Outpatient Encounter Medications as of 4/30/2025   Medication Sig Dispense Refill    triamcinolone acetonide (KENALOG) 0.1 % Cream Apply 1 Application topically 2 times a day.      potassium chloride SA (K-DUR) 10 MEQ Tab CR Take 1 Tablet by mouth every Monday, Wednesday, and Friday. 30 Tablet 3    apixaban (ELIQUIS) 2.5mg Tab Take 1 Tablet by mouth 2 times a day. 200 Tablet 1    FARXIGA 5 MG Tab Take 1 tablet by mouth every day. 100 Tablet 1    furosemide (LASIX) 20 MG Tab Take 1 Tablet by mouth every day. 1 tab, daily 100 Tablet 1    gabapentin (NEURONTIN) 100 MG Cap Take 100 mg by mouth every day.      atorvastatin (LIPITOR) 80 MG tablet Take 1 Tablet by mouth every evening. 100 Tablet 3    amiodarone (CORDARONE) 100 MG tablet Take 1 Tablet by mouth every day. 100 Tablet 3    acetaminophen (TYLENOL) 325 MG Tab Take 325-650 mg by mouth.      Multiple Vitamin (MULTI VITAMIN PO) Take 1 Tablet by mouth every morning.      [DISCONTINUED] potassium chloride SA (KDUR) 20 MEQ Tab CR Take 1 Tablet by mouth 2 times a day with meals. (Patient taking differently: Take 10 mEq by mouth every day. EVERY MONDAY,WEDNESDAY,FRIDAYS.) 200 Tablet 3     No facility-administered encounter medications on file as of 4/30/2025.     Review of Systems   Constitutional:  Negative for chills and fever.   HENT:  Negative for congestion.    Respiratory:  Negative for cough and shortness of breath.    Cardiovascular:  Negative for chest pain, palpitations, orthopnea, leg swelling and PND.   Gastrointestinal:  Negative  "for abdominal pain and nausea.   Musculoskeletal:  Negative for myalgias.   Skin:  Negative for rash.   Neurological:  Negative for dizziness, loss of consciousness and headaches.   Endo/Heme/Allergies:  Does not bruise/bleed easily.   Psychiatric/Behavioral:  The patient does not have insomnia.               Objective     /62 (BP Location: Left arm, Patient Position: Sitting, BP Cuff Size: Adult)   Pulse 64   Resp 14   Ht 1.6 m (5' 3\")   Wt 76.2 kg (167 lb 15.9 oz)   SpO2 95%   BMI 29.76 kg/m²     Physical Exam  Constitutional:       Appearance: She is well-developed.   HENT:      Head: Normocephalic.   Neck:      Vascular: No JVD.   Cardiovascular:      Rate and Rhythm: Normal rate and regular rhythm.      Heart sounds: Normal heart sounds.   Pulmonary:      Effort: Pulmonary effort is normal. No respiratory distress.      Breath sounds: Normal breath sounds. No wheezing or rales.   Abdominal:      General: Bowel sounds are normal. There is no distension.      Palpations: Abdomen is soft.      Tenderness: There is no abdominal tenderness.   Musculoskeletal:         General: Normal range of motion.      Cervical back: Normal range of motion and neck supple.   Skin:     General: Skin is warm and dry.      Findings: No rash.   Neurological:      Mental Status: She is alert and oriented to person, place, and time.   Psychiatric:         Mood and Affect: Mood normal.         Behavior: Behavior normal.       ECHOCARDIOGRAPHY:    Interpretation Summary of TTE of 4/23/2025 ([facility]):  Left ventricular systolic function is normal.   The Ejection Fraction estimate is 55-60%   The right ventricle is normal in size and function.   There is moderate mitral regurgitation   There appears to be a linear echodensity in the ascending aorta which is not hypermobile. There is   also aortic atherosclerosis. Cannot rule out aortic dissection. If clinically indicated, consider   CTA chest to definitively evaluate. "     CONCLUSIONS OF TTE OF 10/9/2023:  Normal left ventricular systolic function.  The left ventricular ejection fraction is visually estimated to be 60%.  The right ventricle is normal in size and systolic function.     Conclusions of Echocardiogram of 1/17/2022 ([facility]):  Normal LV cavity size. Normal LV wall thickness. Borderline reduced LV systolic function. Estimated LVEF of 50%. Indeterminate LV diastolic function.   Normal right ventricular size and systolic function   Mild left atrial enlargement.   Normal right atrial size.   Interatrial septum appears intact. No interatrial shunt by bubble study.   Mild mitral annular calcification. Mildly thickened mitral valve leaflets with normal leaflet mobility. Mild mitral regurgitation.   Mild aortic valve thickening with normal leaflet mobility.   Normal appearing tricuspid valve. Moderate to severe tricuspid regurgitation. Estimated RVSP of 41 mm Hg suggests mild pulmonary hypertension.   Mild pulmonic insufficiency.   No pericardial effusion.   Inferior vena cava is normal in size with a normal collapsibility index, suggesting normal RA pressure.   Persistent atrial flutter.      STRESS TESTING:    Narrative of MPI o 4/23/2025 ([facility]):    LOW RISK STUDY  No significant inducible/reversible ischemia.  Normal LV systolic function, EF 74%. Normal regional wall motion.    CT SCAN(S):    Impression of CT-CTA of chest of 4/23/2025 ([facility]):  1.  No acute chest findings. No aortic pathology, dissection or aneurysm.  2.  Mild cardiomegaly.  3.  Anterior upper mediastinal mass, favor substernal thyroid. For reassurance,  CT follow-up for size stability in 3-6 months recommended.    LABS:    Component      Latest Ref Rng 4/23/2025   Glycohemoglobin      4.2 - 5.8 % 6 (H) (E)      Component  Ref Range & Units 7 d ago   Cholesterol, S/P  <=200 mg/dL 130   Triglycerides  <=150 mg/dL 71   LDL Cholesterol  <=99 mg/dL 51   HDL Cholesterol  40 - 59 mg/dL 66 High     Cholesterol/HDL Ratio  <=4.5 ratio 2     Component  Ref Range & Units 7 d ago   Sodium  136 - 144 mmol/L 143   Potassium  3.6 - 5.1 mmol/L 3.4 Low    Chloride  102 - 110 mmol/L 107   Co2  22 - 32 mmol/L 29   Bun  8 - 20 mg/dL 20   Creatinine  0.60 - 1.10 mg/dL 1.14 High    Calcium  8.7 - 10.3 mg/dL 9   Glom Filt Rate, Est  >60 mL/min/1.7 45 Low    Glucose  60 - 99 mg/dL 95   Anion Gap  2 - 11 mmol/L 7       Assessment & Plan     1. Pain of right upper extremity        2. Chronic heart failure with preserved ejection fraction (HFpEF) (HCC)  potassium chloride SA (K-DUR) 10 MEQ Tab CR      3. Paroxysmal atrial fibrillation (HCC)        4. Typical atrial flutter (HCC)        5. Chronic anticoagulation        6. Circulating anticoagulants (HCC)        7. History of TIA (transient ischemic attack)        8. Essential hypertension, benign        9. Mixed hyperlipidemia            Medical Decision Making: Today's Assessment/Status/Plan:        Hospitalization in late April 2025 for right arm pain, with negative workup, including MPI, echo and CT-CTA of chest. Arm pain has resolved.  HFpEF, stage C, class II, LVEF 55-60% on echo in April 2025. No overt HF symptoms. Continue:  Farxiga 5mg once daily  Lasix 20mg once daily  Eliquis 2.5mg twice daily  KCl 10mEq M/W/F  Lipitor 80mg once daily  3.   History of paroxysmal atrial fibrillation/flutter, in sinus rhythm, on low dose Amiodarone 100mg once daily.  4.   Chronic anticoagulation with low dose Eliquis, no bleeding problems.  5.   History of TIA, on Eliquis and Lipitor 80mg.  6.   Hypertension, not currently on any BP medication; she remains on Lasix and KCl. BP periodically goes up; urged to keep track of BP athome.  7.   Hyperlipidemia, treated with Lipitor 80mg. Last LDL was 51 (at goal).    Her right arm pain is resolved; we reviewed echocardiogram, MPI and CT-CTA of chest.  She is still debating which cardiology practice she would like to follow-up with; she will let  us know.  Same medications for now.    Follow-up in 3-6 months; sooner if clinical condition changes.

## 2025-06-11 DIAGNOSIS — E78.2 MIXED HYPERLIPIDEMIA: ICD-10-CM

## 2025-06-11 RX ORDER — ATORVASTATIN CALCIUM 80 MG/1
80 TABLET, FILM COATED ORAL EVERY EVENING
Qty: 90 TABLET | Refills: 3 | Status: SHIPPED | OUTPATIENT
Start: 2025-06-11

## 2025-06-11 NOTE — TELEPHONE ENCOUNTER
Is the patient due for a refill? Yes    Was the patient seen the last 15 months? Yes    Date of last office visit: 04/30/2025    Does the patient have an upcoming appointment?  No       Provider to refill:AB    Does the patient have jail Plus and need 100-day supply? (This applies to ALL medications) Patient does not have SCP

## 2025-06-16 DIAGNOSIS — I10 ESSENTIAL HYPERTENSION, BENIGN: Primary | ICD-10-CM

## 2025-06-16 DIAGNOSIS — I50.32 CHRONIC HEART FAILURE WITH PRESERVED EJECTION FRACTION (HFPEF) (HCC): ICD-10-CM

## 2025-06-19 RX ORDER — FUROSEMIDE 20 MG/1
20 TABLET ORAL
Qty: 90 TABLET | Refills: 1 | Status: SHIPPED | OUTPATIENT
Start: 2025-06-19

## 2025-06-19 NOTE — TELEPHONE ENCOUNTER
To AB - please refill Lasix if appropriate. Potassium remains low at 3.4 and creatinine still elevated at 1.14, but improved from previous. Pt taking K 10 meq M,W,Fr. Levels outside of RN protocol. Thank you!    Last OV with AB on 4/30/25. Last labs 4/23/25 K 3.4 and creatinine 1.14.

## 2025-07-03 DIAGNOSIS — Z79.899 LONG TERM CURRENT USE OF AMIODARONE: Primary | ICD-10-CM

## 2025-07-03 DIAGNOSIS — I48.19 PERSISTENT ATRIAL FIBRILLATION (HCC): ICD-10-CM

## 2025-07-03 RX ORDER — AMIODARONE HYDROCHLORIDE 100 MG/1
100 TABLET ORAL DAILY
Qty: 90 TABLET | Refills: 0 | Status: SHIPPED | OUTPATIENT
Start: 2025-07-03

## 2025-07-03 NOTE — LETTER
July 3, 2025        Pham Stafford  [address]  [place] [address]    Dear Pham,     You are due for your routine labs/testing for monitoring you and your amiodarone medication treatment.     Any patient taking Amiodarone is required to get certain labs and tests done.   Liver function test, TSH w/reflex to FT4 and EKG are required every 6 months.  2 view chest x-ray and dilated eye exam are required yearly.    Per your chart you still need to complete a TSH w/reflex to FT4 lab and an EKG.      I have included the orders for these two labs/tests with this letter.     You also need to have a dilated eye exam. Please contact your eye doctor and tell them that you need a eye exam with dilation, let them know that the exam is for amiodarone monitoring.    Please complete these as soon as possible to ensure further refills. In the meantime you have been given a 90 day courtesy refill.    Please let me know if you have any other questions or concerns.     Thank you,  DARIA Schumacher  Electronically Signed    [facility]  [phone] phone  [phone] fax         Electronically Signed

## 2025-07-03 NOTE — TELEPHONE ENCOUNTER
Chart reviewed re:amiodarone    Last OV 4/30/25 AB RTC 3-6 months if not established with another cardiology practice.  [facility] None    Labs LFT-4/22/25           Refex to FT4 1/16/22  Testing EKG 8/9/24               Chest x ray 10/30/24  Dilated eye exam. None on chart    Letter sent to pt w/copy of TSH reflex to FT4 order and EKG order also including informaton dilated eye exam sent [employer]